# Patient Record
Sex: FEMALE | URBAN - METROPOLITAN AREA
[De-identification: names, ages, dates, MRNs, and addresses within clinical notes are randomized per-mention and may not be internally consistent; named-entity substitution may affect disease eponyms.]

---

## 2019-06-11 ENCOUNTER — APPOINTMENT (RX ONLY)
Dept: URBAN - METROPOLITAN AREA CLINIC 4 | Facility: CLINIC | Age: 74
Setting detail: DERMATOLOGY
End: 2019-06-11

## 2019-06-11 DIAGNOSIS — L82.1 OTHER SEBORRHEIC KERATOSIS: ICD-10-CM

## 2019-06-11 DIAGNOSIS — Z71.89 OTHER SPECIFIED COUNSELING: ICD-10-CM

## 2019-06-11 DIAGNOSIS — L57.8 OTHER SKIN CHANGES DUE TO CHRONIC EXPOSURE TO NONIONIZING RADIATION: ICD-10-CM

## 2019-06-11 DIAGNOSIS — L57.0 ACTINIC KERATOSIS: ICD-10-CM

## 2019-06-11 DIAGNOSIS — Z85.828 PERSONAL HISTORY OF OTHER MALIGNANT NEOPLASM OF SKIN: ICD-10-CM

## 2019-06-11 PROBLEM — I10 ESSENTIAL (PRIMARY) HYPERTENSION: Status: ACTIVE | Noted: 2019-06-11

## 2019-06-11 PROBLEM — D48.5 NEOPLASM OF UNCERTAIN BEHAVIOR OF SKIN: Status: ACTIVE | Noted: 2019-06-11

## 2019-06-11 PROBLEM — E13.9 OTHER SPECIFIED DIABETES MELLITUS WITHOUT COMPLICATIONS: Status: ACTIVE | Noted: 2019-06-11

## 2019-06-11 PROBLEM — J44.9 CHRONIC OBSTRUCTIVE PULMONARY DISEASE, UNSPECIFIED: Status: ACTIVE | Noted: 2019-06-11

## 2019-06-11 PROBLEM — E78.5 HYPERLIPIDEMIA, UNSPECIFIED: Status: ACTIVE | Noted: 2019-06-11

## 2019-06-11 PROCEDURE — 17000 DESTRUCT PREMALG LESION: CPT | Mod: 59

## 2019-06-11 PROCEDURE — 11102 TANGNTL BX SKIN SINGLE LES: CPT

## 2019-06-11 PROCEDURE — ? BIOPSY BY SHAVE METHOD

## 2019-06-11 PROCEDURE — ? COUNSELING

## 2019-06-11 PROCEDURE — ? LIQUID NITROGEN

## 2019-06-11 PROCEDURE — 99202 OFFICE O/P NEW SF 15 MIN: CPT | Mod: 25

## 2019-06-11 ASSESSMENT — LOCATION SIMPLE DESCRIPTION DERM
LOCATION SIMPLE: RIGHT FOREARM
LOCATION SIMPLE: CHEST
LOCATION SIMPLE: TRAPEZIAL NECK
LOCATION SIMPLE: CHIN
LOCATION SIMPLE: LEFT CHEEK
LOCATION SIMPLE: LEFT FOREARM

## 2019-06-11 ASSESSMENT — LOCATION ZONE DERM
LOCATION ZONE: TRUNK
LOCATION ZONE: FACE
LOCATION ZONE: ARM
LOCATION ZONE: NECK

## 2019-06-11 ASSESSMENT — LOCATION DETAILED DESCRIPTION DERM
LOCATION DETAILED: LEFT INFERIOR CENTRAL MALAR CHEEK
LOCATION DETAILED: LEFT PROXIMAL DORSAL FOREARM
LOCATION DETAILED: LEFT CHIN
LOCATION DETAILED: LEFT CENTRAL BUCCAL CHEEK
LOCATION DETAILED: UPPER STERNUM
LOCATION DETAILED: RIGHT PROXIMAL DORSAL FOREARM
LOCATION DETAILED: MID TRAPEZIAL NECK

## 2019-06-11 NOTE — PROCEDURE: LIQUID NITROGEN
Consent: The patient's consent was obtained including but not limited to risks of crusting, scabbing, blistering, scarring, darker or lighter pigmentary change, recurrence, incomplete removal and infection.
Render Note In Bullet Format When Appropriate: No
Duration Of Freeze Thaw-Cycle (Seconds): 0
Post-Care Instructions: I reviewed with the patient in detail post-care instructions. Patient is to wear sunprotection, and avoid picking at any of the treated lesions. Pt may apply Vaseline  or Aquaphor to crusted or scabbing areas.
Detail Level: Detailed

## 2019-06-11 NOTE — PROCEDURE: BIOPSY BY SHAVE METHOD
Curettage Text: The wound bed was treated with curettage after the biopsy was performed.
Lab: [id number]
Destruction After The Procedure: No
Consent: Written consent was obtained and risks were reviewed including but not limited to scarring, infection, bleeding, scabbing, incomplete removal, nerve damage and allergy to anesthesia.
Depth Of Biopsy: dermis
X Size Of Lesion In Cm: 0
Anesthesia Type: 1% lidocaine with epinephrine
Biopsy Type: H and E
Cryotherapy Text: The wound bed was treated with cryotherapy after the biopsy was performed.
Wound Care: Aquaphor
Lab Facility: 
Electrodesiccation Text: The wound bed was treated with electrodesiccation after the biopsy was performed.
Billing Type: Third-Party Bill
Was A Bandage Applied: Yes
Type Of Destruction Used: Curettage
Electrodesiccation And Curettage Text: The wound bed was treated with electrodesiccation and curettage after the biopsy was performed.
Biopsy Method: 15 blade
Dressing: Band-Aid
Detail Level: Detailed
Post-Care Instructions: I reviewed with the patient in detail post-care instructions. Patient is to keep the biopsy site dry overnight, and then apply bacitracin twice daily until healed. Patient may apply hydrogen peroxide soaks to remove any crusting.
Hemostasis: Aluminum Chloride and Electrocautery
Silver Nitrate Text: The wound bed was treated with silver nitrate after the biopsy was performed.
Notification Instructions: Patient will be notified of biopsy results. However, patient instructed to call the office if not contacted within 2 weeks.
Size Of Lesion In Cm: 1.2

## 2019-07-08 ENCOUNTER — APPOINTMENT (RX ONLY)
Dept: URBAN - METROPOLITAN AREA CLINIC 4 | Facility: CLINIC | Age: 74
Setting detail: DERMATOLOGY
End: 2019-07-08

## 2019-07-08 PROBLEM — D04.9 CARCINOMA IN SITU OF SKIN, UNSPECIFIED: Status: ACTIVE | Noted: 2019-07-08

## 2019-07-08 PROCEDURE — ? MOHS SURGERY PHONE CONSULTATION

## 2019-07-08 NOTE — PROCEDURE: MOHS SURGERY PHONE CONSULTATION
Does The Patient Have A Living Will (Optional)?: No
Pathology Accession #: [id number]
Office Location Of Mohs Surgery: [place] [id number]
Has The Pathology Report Been Received?: Yes
Detail Level: Simple
Referring Provider: Camryn Mason PA-C
Which Antibiotic Do They Take For Surgical Prophylaxis?: Amoxicillin (2 grams)
If Yes- Details?: heart attack 2018
Patient Reported Location: Left Lateral Mandibular Cheek
If Yes- Additional Details: ankle 12/2019; screws

## 2019-07-29 ENCOUNTER — APPOINTMENT (RX ONLY)
Dept: URBAN - METROPOLITAN AREA CLINIC 36 | Facility: CLINIC | Age: 74
Setting detail: DERMATOLOGY
End: 2019-07-29

## 2019-07-29 PROBLEM — C44.329 SQUAMOUS CELL CARCINOMA OF SKIN OF OTHER PARTS OF FACE: Status: ACTIVE | Noted: 2019-07-29

## 2019-07-29 PROCEDURE — 17312 MOHS ADDL STAGE: CPT

## 2019-07-29 PROCEDURE — 12052 INTMD RPR FACE/MM 2.6-5.0 CM: CPT

## 2019-07-29 PROCEDURE — 17311 MOHS 1 STAGE H/N/HF/G: CPT

## 2019-07-29 PROCEDURE — ? MOHS SURGERY

## 2019-07-29 NOTE — PROCEDURE: MOHS SURGERY
Hemostasis: Electrocautery
Stage 7: Additional Anesthesia Volume In Cc: 0
Wound Care (No Sutures): Petrolatum
Deep Sutures: 4-0 Maxon
Graft Cartilage Fenestration Text: The cartilage was fenestrated with a 2mm punch biopsy to help facilitate graft survival and healing.
Bilobed Flap Text: The defect edges were debeveled with a #15 scalpel blade.  Given the location of the defect and the proximity to free margins a bilobe flap was deemed most appropriate.  Using a sterile surgical marker, an appropriate bilobe flap drawn around the defect.    The area thus outlined was incised deep to adipose tissue with a #15 scalpel blade.  The skin margins were undermined to an appropriate distance in all directions utilizing iris scissors.
Information: Selecting Yes will display possible errors in your note based on the variables you have selected. This validation is only offered as a suggestion for you. PLEASE NOTE THAT THE VALIDATION TEXT WILL BE REMOVED WHEN YOU FINALIZE YOUR NOTE. IF YOU WANT TO FAX A PRELIMINARY NOTE YOU WILL NEED TO TOGGLE THIS TO 'NO' IF YOU DO NOT WANT IT IN YOUR FAXED NOTE.
X Size Of Lesion In Cm (Optional): 0.7
Cartilage Fenestration Performed?: No
Postop Diagnosis: same
Surgical Defect Width In Cm (Optional): 1.1
Show Biopsy Photo Reviewed Variable: Yes
Pain Refusal Text: I offered to prescribe pain medication but the patient refused to take this medication.
Surgeon/Pathologist Verbiage (Will Incorporate Name Of Surgeon From Intro If Not Blank): operated in two distinct and integrated capacities as the surgeon and pathologist.
Mercedes Flap Text: The defect edges were debeveled with a #15 scalpel blade.  Given the location of the defect, shape of the defect and the proximity to free margins a Mercedes flap was deemed most appropriate.  Using a sterile surgical marker, an appropriate advancement flap was drawn incorporating the defect and placing the expected incisions within the relaxed skin tension lines where possible. The area thus outlined was incised deep to adipose tissue with a #15 scalpel blade.  The skin margins were undermined to an appropriate distance in all directions utilizing iris scissors.
Melolabial Interpolation Flap Text: A decision was made to reconstruct the defect utilizing an interpolation axial flap and a staged reconstruction.  A telfa template was made of the defect.  This telfa template was then used to outline the melolabial interpolation flap.  The donor area for the pedicle flap was then injected with anesthesia.  The flap was excised through the skin and subcutaneous tissue down to the layer of the underlying musculature.  The pedicle flap was carefully excised within this deep plane to maintain its blood supply.  The edges of the donor site were undermined.   The donor site was closed in a primary fashion.  The pedicle was then rotated into position and sutured.  Once the tube was sutured into place, adequate blood supply was confirmed with blanching and refill.  The pedicle was then wrapped with xeroform gauze and dressed appropriately with a telfa and gauze bandage to ensure continued blood supply and protect the attached pedicle.
Complex Repair And Graft Additional Text (Will Appearing After The Standard Complex Repair Text): The complex repair was not sufficient to completely close the primary defect. The remaining additional defect was repaired with the graft mentioned below.
Suturegard Retention Suture: 2-0 Nylon
Mid-Level Procedure Text (D): After obtaining clear surgical margins the patient was sent to a mid-level provider for surgical repair.  The patient understands they will receive post-surgical care and follow-up from the mid-level provider.
Consent 2/Introductory Paragraph: Mohs surgery was explained to the patient and consent was obtained. The risks, benefits and alternatives to therapy were discussed in detail. Specifically, the risks of infection, scarring, bleeding, prolonged wound healing, incomplete removal, allergy to anesthesia, nerve injury and recurrence were addressed. Prior to the procedure, the treatment site was clearly identified and confirmed by the patient. All components of Universal Protocol/PAUSE Rule completed.
Suture Removal: 7 days
Referred To Otolaryngology For Closure Text (Leave Blank If You Do Not Want): After obtaining clear surgical margins the patient was sent to otolaryngology for surgical repair.  The patient understands they will receive post-surgical care and follow-up from the referring physician's office.
Island Pedicle Flap Text: The defect edges were debeveled with a #15 scalpel blade.  Given the location of the defect, shape of the defect and the proximity to free margins an island pedicle advancement flap was deemed most appropriate.  Using a sterile surgical marker, an appropriate advancement flap was drawn incorporating the defect, outlining the appropriate donor tissue and placing the expected incisions within the relaxed skin tension lines where possible.    The area thus outlined was incised deep to adipose tissue with a #15 scalpel blade.  The skin margins were undermined to an appropriate distance in all directions around the primary defect and laterally outward around the island pedicle utilizing iris scissors.  There was minimal undermining beneath the pedicle flap.
Plastic Surgeon Procedure Text (A): After obtaining clear surgical margins the patient was sent to plastics for surgical repair.  The patient understands they will receive post-surgical care and follow-up from the referring physician's office.
Bcc Histology Text: There were numerous aggregates of basaloid cells.
Area M Indication Text: Tumors in this location are included in Area M (cheek, forehead, scalp, neck, jawline and pretibial skin).  Mohs surgery is indicated for tumors in these anatomic locations.
Repair Type: Purse String Closure (Intermediate)
Cheiloplasty (Less Than 50%) Text: A decision was made to reconstruct the defect with a  cheiloplasty.  The defect was undermined extensively.  Additional obicularis oris muscle was excised with a 15 blade scalpel.  The defect was converted into a full thickness wedge, of less than 50% of the vertical height of the lip, to facilite a better cosmetic result.  Small vessels were then tied off with 5-0 monocyrl. The obicularis oris, superficial fascia, adipose and dermis were then reapproximated.  After the deeper layers were approximated the epidermis was reapproximated with particular care given to realign the vermilion border.
Subsequent Stages Histo Method Verbiage: Using a similar technique to that described above, a thin layer of tissue was removed from all areas where tumor was visible on the previous stage.  The tissue was again oriented, mapped, dyed, and processed as above.
Unique Flap 4 Name: Banner Flap
Rotation Flap Text: The defect edges were debeveled with a #15 scalpel blade.  Given the location of the defect, shape of the defect and the proximity to free margins a rotation flap was deemed most appropriate.  Using a sterile surgical marker, an appropriate rotation flap was drawn incorporating the defect and placing the expected incisions within the relaxed skin tension lines where possible.    The area thus outlined was incised deep to adipose tissue with a #15 scalpel blade.  The skin margins were undermined to an appropriate distance in all directions utilizing iris scissors.
Length To Time In Minutes Device Was In Place: 10
Consent (Spinal Accessory)/Introductory Paragraph: The rationale for Mohs was explained to the patient and consent was obtained. The risks, benefits and alternatives to therapy were discussed in detail. Specifically, the risks of damage to the spinal accessory nerve, infection, scarring, bleeding, prolonged wound healing, incomplete removal, allergy to anesthesia, and recurrence were addressed. Prior to the procedure, the treatment site was clearly identified and confirmed by the patient. All components of Universal Protocol/PAUSE Rule completed.
Island Pedicle Flap-Requiring Vessel Identification Text: The defect edges were debeveled with a #15 scalpel blade.  Given the location of the defect, shape of the defect and the proximity to free margins an island pedicle advancement flap was deemed most appropriate.  Using a sterile surgical marker, an appropriate advancement flap was drawn, based on the axial vessel mentioned above, incorporating the defect, outlining the appropriate donor tissue and placing the expected incisions within the relaxed skin tension lines where possible.    The area thus outlined was incised deep to adipose tissue with a #15 scalpel blade.  The skin margins were undermined to an appropriate distance in all directions around the primary defect and laterally outward around the island pedicle utilizing iris scissors.  There was minimal undermining beneath the pedicle flap.
Repair Performed By Another Provider Text (Leave Blank If You Do Not Want): After obtaining clear surgical margins the defect was repaired by another provider.
Keystone Flap Text: The defect edges were debeveled with a #15 scalpel blade.  Given the location of the defect, shape of the defect a keystone flap was deemed most appropriate.  Using a sterile surgical marker, an appropriate keystone flap was drawn incorporating the defect, outlining the appropriate donor tissue and placing the expected incisions within the relaxed skin tension lines where possible. The area thus outlined was incised deep to adipose tissue with a #15 scalpel blade.  The skin margins were undermined to an appropriate distance in all directions around the primary defect and laterally outward around the flap utilizing iris scissors.
Graft Donor Site Bandage (Optional-Leave Blank If You Don't Want In Note): Aquaphor and telefa placed on wound. Pressure dressing applied to donor site
Advancement Flap (Single) Text: The defect edges were debeveled with a #15 scalpel blade.  Given the location of the defect and the proximity to free margins a single advancement flap was deemed most appropriate.  Using a sterile surgical marker, an appropriate advancement flap was drawn incorporating the defect and placing the expected incisions within the relaxed skin tension lines where possible.    The area thus outlined was incised deep to adipose tissue with a #15 scalpel blade.  The skin margins were undermined to an appropriate distance in all directions utilizing iris scissors.
Epidermal Autograft Text: The defect edges were debeveled with a #15 scalpel blade.  Given the location of the defect, shape of the defect and the proximity to free margins an epidermal autograft was deemed most appropriate.  Using a sterile surgical marker, the primary defect shape was transferred to the donor site. The epidermal graft was then harvested.  The skin graft was then placed in the primary defect and oriented appropriately.
Asc Procedure Text (B): After obtaining clear surgical margins the patient was sent to an ASC for surgical repair.  The patient understands they will receive post-surgical care and follow-up from the ASC physician.
Lazy S Complex Repair Preamble Text (Leave Blank If You Do Not Want): Extensive wide undermining was performed at least 2 cm in all directions.
Melolabial Transposition Flap Text: The defect edges were debeveled with a #15 scalpel blade.  Given the location of the defect and the proximity to free margins a melolabial flap was deemed most appropriate.  Using a sterile surgical marker, an appropriate melolabial transposition flap was drawn incorporating the defect.    The area thus outlined was incised deep to adipose tissue with a #15 scalpel blade.  The skin margins were undermined to an appropriate distance in all directions utilizing iris scissors.
Split-Thickness Skin Graft Text: The defect edges were debeveled with a #15 scalpel blade.  Given the location of the defect, shape of the defect and the proximity to free margins a split thickness skin graft was deemed most appropriate.  Using a sterile surgical marker, the primary defect shape was transferred to the donor site. The split thickness graft was then harvested.  The skin graft was then placed in the primary defect and oriented appropriately.
Manual Repair Warning Statement: We plan on removing the manually selected variable below in favor of our much easier automatic structured text blocks found in the previous tab. We decided to do this to help make the flow better and give you the full power of structured data. Manual selection is never going to be ideal in our platform and I would encourage you to avoid using manual selection from this point on, especially since I will be sunsetting this feature. It is important that you do one of two things with the customized text below. First, you can save all of the text in a word file so you can have it for future reference. Second, transfer the text to the appropriate area in the Library tab. Lastly, if there is a flap or graft type which we do not have you need to let us know right away so I can add it in before the variable is hidden. No need to panic, we plan to give you roughly 6 months to make the change.
Consent Type: Consent 1 (Standard)
Consent (Scalp)/Introductory Paragraph: The rationale for Mohs was explained to the patient and consent was obtained. The risks, benefits and alternatives to therapy were discussed in detail. Specifically, the risks of changes in hair growth pattern secondary to repair, infection, scarring, bleeding, prolonged wound healing, incomplete removal, allergy to anesthesia, nerve injury and recurrence were addressed. Prior to the procedure, the treatment site was clearly identified and confirmed by the patient. All components of Universal Protocol/PAUSE Rule completed.
Stage 10: Additional Anesthesia Type: 1% lidocaine with epinephrine
Crescentic Advancement Flap Text: The defect edges were debeveled with a #15 scalpel blade.  Given the location of the defect and the proximity to free margins a crescentic advancement flap was deemed most appropriate.  Using a sterile surgical marker, the appropriate advancement flap was drawn incorporating the defect and placing the expected incisions within the relaxed skin tension lines where possible.    The area thus outlined was incised deep to adipose tissue with a #15 scalpel blade.  The skin margins were undermined to an appropriate distance in all directions utilizing iris scissors.
S Plasty Text: Given the location and shape of the defect, and the orientation of relaxed skin tension lines, an S-plasty was deemed most appropriate for repair.  Using a sterile surgical marker, the appropriate outline of the S-plasty was drawn, incorporating the defect and placing the expected incisions within the relaxed skin tension lines where possible.  The area thus outlined was incised deep to adipose tissue with a #15 scalpel blade.  The skin margins were undermined to an appropriate distance in all directions utilizing iris scissors. The skin flaps were advanced over the defect.  The opposing margins were then approximated with interrupted buried subcutaneous sutures.
Xenograft Text: The defect edges were debeveled with a #15 scalpel blade.  Given the location of the defect, shape of the defect and the proximity to free margins a xenograft was deemed most appropriate.  The graft was then trimmed to fit the size of the defect.  The graft was then placed in the primary defect and oriented appropriately.
Anesthesia Volume In Cc: 2.8
Helical Rim Advancement Flap Text: The defect edges were debeveled with a #15 blade scalpel.  Given the location of the defect and the proximity to free margins (helical rim) a double helical rim advancement flap was deemed most appropriate.  Using a sterile surgical marker, the appropriate advancement flaps were drawn incorporating the defect and placing the expected incisions between the helical rim and antihelix where possible.  The area thus outlined was incised through and through with a #15 scalpel blade.  With a skin hook and iris scissors, the flaps were gently and sharply undermined and freed up.
Same Histology In Subsequent Stages Text: The pattern and morphology of the tumor is as described in the first stage.
Lazy S Intermediate Repair Preamble Text (Leave Blank If You Do Not Want): Undermining was performed with blunt dissection.
O-Z Flap Text: The defect edges were debeveled with a #15 scalpel blade.  Given the location of the defect, shape of the defect and the proximity to free margins an O-Z flap was deemed most appropriate.  Using a sterile surgical marker, an appropriate transposition flap was drawn incorporating the defect and placing the expected incisions within the relaxed skin tension lines where possible. The area thus outlined was incised deep to adipose tissue with a #15 scalpel blade.  The skin margins were undermined to an appropriate distance in all directions utilizing iris scissors.
Z Plasty Text: The lesion was extirpated to the level of the fat with a #15 scalpel blade.  Given the location of the defect, shape of the defect and the proximity to free margins a Z-plasty was deemed most appropriate for repair.  Using a sterile surgical marker, the appropriate transposition arms of the Z-plasty were drawn incorporating the defect and placing the expected incisions within the relaxed skin tension lines where possible.    The area thus outlined was incised deep to adipose tissue with a #15 scalpel blade.  The skin margins were undermined to an appropriate distance in all directions utilizing iris scissors.  The opposing transposition arms were then transposed into place in opposite direction and anchored with interrupted buried subcutaneous sutures.
Partial Purse String (Intermediate) Text: Given the location of the defect and the characteristics of the surrounding skin an intermediate purse string closure was deemed most appropriate.  Undermining was performed circumfirentially around the surgical defect.  A purse string suture was then placed and tightened. Wound tension only allowed a partial closure of the circular defect.
Oculoplastic Surgeon Procedure Text (D): After obtaining clear surgical margins the patient was sent to oculoplastics for surgical repair.  The patient understands they will receive post-surgical care and follow-up from the referring physician's office.
Cheek Interpolation Flap Text: A decision was made to reconstruct the defect utilizing an interpolation axial flap and a staged reconstruction.  A telfa template was made of the defect.  This telfa template was then used to outline the Cheek Interpolation flap.  The donor area for the pedicle flap was then injected with anesthesia.  The flap was excised through the skin and subcutaneous tissue down to the layer of the underlying musculature.  The interpolation flap was carefully excised within this deep plane to maintain its blood supply.  The edges of the donor site were undermined.   The donor site was closed in a primary fashion.  The pedicle was then rotated into position and sutured.  Once the tube was sutured into place, adequate blood supply was confirmed with blanching and refill.  The pedicle was then wrapped with xeroform gauze and dressed appropriately with a telfa and gauze bandage to ensure continued blood supply and protect the attached pedicle.
Localized Dermabrasion With Wire Brush Text: The patient was draped in routine manner.  Localized dermabrasion using 3 x 17 mm wire brush was performed in routine manner to papillary dermis. This spot dermabrasion is being performed to complete skin cancer reconstruction. It also will eliminate the other sun damaged precancerous cells that are known to be part of the regional effect of a lifetime's worth of sun exposure. This localized dermabrasion is therapeutic and should not be considered cosmetic in any regard.
Medical Necessity Statement: Based on my medical judgement, Mohs surgery is the most appropriate treatment for this cancer compared to other treatments.
Stage 1: Number Of Blocks?: 1
Bilobed Transposition Flap Text: The defect edges were debeveled with a #15 scalpel blade.  Given the location of the defect and the proximity to free margins a bilobed transposition flap was deemed most appropriate.  Using a sterile surgical marker, an appropriate bilobe flap drawn around the defect.    The area thus outlined was incised deep to adipose tissue with a #15 scalpel blade.  The skin margins were undermined to an appropriate distance in all directions utilizing iris scissors.
Secondary Intention Text (Leave Blank If You Do Not Want): The defect will heal with secondary intention.
Number Of Stages: 2
Mauc Instructions: By selecting yes to the question below the MAUC number will be added into the note.  This will be calculated automatically based on the diagnosis chosen, the size entered, the body zone selected (H,M,L) and the specific indications you chose. You will also have the option to override the Mohs AUC if you disagree with the automatically calculated number and this option is found in the Case Summary tab.
Mastoid Interpolation Flap Text: A decision was made to reconstruct the defect utilizing an interpolation axial flap and a staged reconstruction.  A telfa template was made of the defect.  This telfa template was then used to outline the mastoid interpolation flap.  The donor area for the pedicle flap was then injected with anesthesia.  The flap was excised through the skin and subcutaneous tissue down to the layer of the underlying musculature.  The pedicle flap was carefully excised within this deep plane to maintain its blood supply.  The edges of the donor site were undermined.   The donor site was closed in a primary fashion.  The pedicle was then rotated into position and sutured.  Once the tube was sutured into place, adequate blood supply was confirmed with blanching and refill.  The pedicle was then wrapped with xeroform gauze and dressed appropriately with a telfa and gauze bandage to ensure continued blood supply and protect the attached pedicle.
Mohs Histo Method Verbiage: Each section was then chromacoded and processed in the Mohs lab using the Mohs protocol and submitted for frozen section.
Modified Advancement Flap Text: The defect edges were debeveled with a #15 scalpel blade.  Given the location of the defect, shape of the defect and the proximity to free margins a modified advancement flap was deemed most appropriate.  Using a sterile surgical marker, an appropriate advancement flap was drawn incorporating the defect and placing the expected incisions within the relaxed skin tension lines where possible.    The area thus outlined was incised deep to adipose tissue with a #15 scalpel blade.  The skin margins were undermined to an appropriate distance in all directions utilizing iris scissors.
Unique Flap 1 Name: Myocutaneous Island pedicle Flap
Consent 3/Introductory Paragraph: I gave the patient a chance to ask questions they had about the procedure.  Following this I explained the Mohs procedure and consent was obtained. The risks, benefits and alternatives to therapy were discussed in detail. Specifically, the risks of infection, scarring, bleeding, prolonged wound healing, incomplete removal, allergy to anesthesia, nerve injury and recurrence were addressed. Prior to the procedure, the treatment site was clearly identified and confirmed by the patient. All components of Universal Protocol/PAUSE Rule completed.
Island Pedicle Flap With Canthal Suspension Text: The defect edges were debeveled with a #15 scalpel blade.  Given the location of the defect, shape of the defect and the proximity to free margins an island pedicle advancement flap was deemed most appropriate.  Using a sterile surgical marker, an appropriate advancement flap was drawn incorporating the defect, outlining the appropriate donor tissue and placing the expected incisions within the relaxed skin tension lines where possible. The area thus outlined was incised deep to adipose tissue with a #15 scalpel blade.  The skin margins were undermined to an appropriate distance in all directions around the primary defect and laterally outward around the island pedicle utilizing iris scissors.  There was minimal undermining beneath the pedicle flap. A suspension suture was placed in the canthal tendon to prevent tension and prevent ectropion.
Graft Donor Site Epidermal Sutures (Optional): 5-0 Ethibond
Area L Indication Text: Tumors in this location are included in Area L (trunk and extremities).  Mohs surgery is indicated for larger tumors, or tumors with aggressive histologic features, in these anatomic locations.
Bcc Infiltrative Histology Text: There were numerous aggregates of basaloid cells demonstrating an infiltrative pattern.
Cheiloplasty (Complex) Text: A decision was made to reconstruct the defect with a  cheiloplasty.  The defect was undermined extensively.  Additional obicularis oris muscle was excised with a 15 blade scalpel.  The defect was converted into a full thickness wedge to facilite a better cosmetic result.  Small vessels were then tied off with 5-0 monocyrl. The obicularis oris, superficial fascia, adipose and dermis were then reapproximated.  After the deeper layers were approximated the epidermis was reapproximated with particular care given to realign the vermilion border.
Mohs Rapid Report Verbiage: The area of clinically evident tumor was marked with skin marking ink and appropriately hatched.  The initial incision was made following the Mohs approach through the skin.  The specimen was taken to the lab, divided into the necessary number of pieces, chromacoded and processed according to the Mohs protocol.  This was repeated in successive stages until a tumor free defect was achieved.
Spiral Flap Text: The defect edges were debeveled with a #15 scalpel blade.  Given the location of the defect, shape of the defect and the proximity to free margins a spiral flap was deemed most appropriate.  Using a sterile surgical marker, an appropriate rotation flap was drawn incorporating the defect and placing the expected incisions within the relaxed skin tension lines where possible. The area thus outlined was incised deep to adipose tissue with a #15 scalpel blade.  The skin margins were undermined to an appropriate distance in all directions utilizing iris scissors.
Unique Flap 1 Text: A decision was made to reconstruct the defect utilizing a myocutaneous Island pedicle Flap based on the levator labii superioris muscle.  A telfa template was made of the defect.  This telfa template was then used to outline the myocutaneous flap, based along the meilolabial fold.  The donor area for the pedicle flap was then injected with anesthesia.  The flap was excised through the skin and subcutaneous tissue down to the layer of the underlying musculature.  The myocutaneous flap was carefully excised within this deep plane to maintain its blood supply. Based on the muscle. The edges of the donor site were undermined.   The donor site was closed in a primary fashion to the point of transposition.  The pedicle was then transposed into position and sutured.  Once the flap was sutured into place, adequate blood supply was confirmed with blanching and refill.
Previous Accession (Optional): [id number]
Consent (Near Eyelid Margin)/Introductory Paragraph: The rationale for Mohs was explained to the patient and consent was obtained. The risks, benefits and alternatives to therapy were discussed in detail. Specifically, the risks of ectropion or eyelid deformity, infection, scarring, bleeding, prolonged wound healing, incomplete removal, allergy to anesthesia, nerve injury and recurrence were addressed. Prior to the procedure, the treatment site was clearly identified and confirmed by the patient. All components of Universal Protocol/PAUSE Rule completed.
Consent (Ear)/Introductory Paragraph: The rationale for Mohs was explained to the patient and consent was obtained. The risks, benefits and alternatives to therapy were discussed in detail. Specifically, the risks of ear deformity, infection, scarring, bleeding, prolonged wound healing, incomplete removal, allergy to anesthesia, nerve injury and recurrence were addressed. Prior to the procedure, the treatment site was clearly identified and confirmed by the patient. All components of Universal Protocol/PAUSE Rule completed.
Advancement Flap (Double) Text: The defect edges were debeveled with a #15 scalpel blade.  Given the location of the defect and the proximity to free margins a double advancement flap was deemed most appropriate.  Using a sterile surgical marker, the appropriate advancement flaps were drawn incorporating the defect and placing the expected incisions within the relaxed skin tension lines where possible.    The area thus outlined was incised deep to adipose tissue with a #15 scalpel blade.  The skin margins were undermined to an appropriate distance in all directions utilizing iris scissors.
Unna Boot Text: An Unna boot was placed to help immobilize the limb and facilitate more rapid healing.
O-T Plasty Text: The defect edges were debeveled with a #15 scalpel blade.  Given the location of the defect, shape of the defect and the proximity to free margins an O-T plasty was deemed most appropriate.  Using a sterile surgical marker, an appropriate O-T plasty was drawn incorporating the defect and placing the expected incisions within the relaxed skin tension lines where possible.    The area thus outlined was incised deep to adipose tissue with a #15 scalpel blade.  The skin margins were undermined to an appropriate distance in all directions utilizing iris scissors.
Dermal Autograft Text: The defect edges were debeveled with a #15 scalpel blade.  Given the location of the defect, shape of the defect and the proximity to free margins a dermal autograft was deemed most appropriate.  Using a sterile surgical marker, the primary defect shape was transferred to the donor site. The area thus outlined was incised deep to adipose tissue with a #15 scalpel blade.  The harvested graft was then trimmed of adipose and epidermal tissue until only dermis was left.  The skin graft was then placed in the primary defect and oriented appropriately.
M-Plasty Complex Repair Preamble Text (Leave Blank If You Do Not Want): Extensive wide undermining was performed.
Rhombic Flap Text: The defect edges were debeveled with a #15 scalpel blade.  Given the location of the defect and the proximity to free margins a rhombic flap was deemed most appropriate.  Using a sterile surgical marker, an appropriate rhombic flap was drawn incorporating the defect.    The area thus outlined was incised deep to adipose tissue with a #15 scalpel blade.  The skin margins were undermined to an appropriate distance in all directions utilizing iris scissors.
Anesthesia Volume In Cc: 6
Cartilage Graft Text: The defect edges were debeveled with a #15 scalpel blade.  Given the location of the defect, shape of the defect, the fact the defect involved a full thickness cartilage defect a cartilage graft was deemed most appropriate.  An appropriate donor site was identified, cleansed, and anesthetized. The cartilage graft was then harvested and transferred to the recipient site, oriented appropriately and then sutured into place.  The secondary defect was then repaired using a primary closure.
Detail Level: Detailed
Post-Care Instructions: I reviewed with the patient in detail post-care instructions. Patient is not to engage in any heavy lifting, exercise, or swimming for the next 14 days. Should the patient develop any fevers, chills, bleeding, severe pain patient will contact the office immediately.
Purse String (Simple) Text: Given the location of the defect and the characteristics of the surrounding skin a purse string closure was deemed most appropriate.  Undermining was performed circumfirentially around the surgical defect.  A purse string suture was then placed and tightened.
A-T Advancement Flap Text: The defect edges were debeveled with a #15 scalpel blade.  Given the location of the defect, shape of the defect and the proximity to free margins an A-T advancement flap was deemed most appropriate.  Using a sterile surgical marker, an appropriate advancement flap was drawn incorporating the defect and placing the expected incisions within the relaxed skin tension lines where possible.    The area thus outlined was incised deep to adipose tissue with a #15 scalpel blade.  The skin margins were undermined to an appropriate distance in all directions utilizing iris scissors.
V-Y Plasty Text: The defect edges were debeveled with a #15 scalpel blade.  Given the location of the defect, shape of the defect and the proximity to free margins an V-Y advancement flap was deemed most appropriate.  Using a sterile surgical marker, an appropriate advancement flap was drawn incorporating the defect and placing the expected incisions within the relaxed skin tension lines where possible.    The area thus outlined was incised deep to adipose tissue with a #15 scalpel blade.  The skin margins were undermined to an appropriate distance in all directions utilizing iris scissors.
Dressing: dry sterile dressing
Bilateral Helical Rim Advancement Flap Text: The defect edges were debeveled with a #15 blade scalpel.  Given the location of the defect and the proximity to free margins (helical rim) a bilateral helical rim advancement flap was deemed most appropriate.  Using a sterile surgical marker, the appropriate advancement flaps were drawn incorporating the defect and placing the expected incisions between the helical rim and antihelix where possible.  The area thus outlined was incised through and through with a #15 scalpel blade.  With a skin hook and iris scissors, the flaps were gently and sharply undermined and freed up.
No Residual Tumor Seen Histology Text: There were no malignant cells seen in the sections examined.
Stage 2: Additional Anesthesia Type: 1% lidocaine with 1:100,000 epinephrine and 408mcg clindamycin/ml and a 1:10 solution of 8.4% sodium bicarbonate
Suturegard Intro: Intraoperative tissue expansion was performed, utilizing the SUTUREGARD device, in order to reduce wound tension.
Double O-Z Flap Text: The defect edges were debeveled with a #15 scalpel blade.  Given the location of the defect, shape of the defect and the proximity to free margins a Double O-Z flap was deemed most appropriate.  Using a sterile surgical marker, an appropriate transposition flap was drawn incorporating the defect and placing the expected incisions within the relaxed skin tension lines where possible. The area thus outlined was incised deep to adipose tissue with a #15 scalpel blade.  The skin margins were undermined to an appropriate distance in all directions utilizing iris scissors.
V-Y Flap Text: The defect edges were debeveled with a #15 scalpel blade.  Given the location of the defect, shape of the defect and the proximity to free margins a V-Y flap was deemed most appropriate.  Using a sterile surgical marker, an appropriate advancement flap was drawn incorporating the defect and placing the expected incisions within the relaxed skin tension lines where possible.    The area thus outlined was incised deep to adipose tissue with a #15 scalpel blade.  The skin margins were undermined to an appropriate distance in all directions utilizing iris scissors.
Suturegard Body: The suture ends were repeatedly re-tightened and re-clamped to achieve the desired tissue expansion.
Cheek-To-Nose Interpolation Flap Text: A decision was made to reconstruct the defect utilizing an interpolation axial flap and a staged reconstruction.  A telfa template was made of the defect.  This telfa template was then used to outline the Cheek-To-Nose Interpolation flap.  The donor area for the pedicle flap was then injected with anesthesia.  The flap was excised through the skin and subcutaneous tissue down to the layer of the underlying musculature.  The interpolation flap was carefully excised within this deep plane to maintain its blood supply.  The edges of the donor site were undermined.   The donor site was closed in a primary fashion.  The pedicle was then rotated into position and sutured.  Once the tube was sutured into place, adequate blood supply was confirmed with blanching and refill.  The pedicle was then wrapped with xeroform gauze and dressed appropriately with a telfa and gauze bandage to ensure continued blood supply and protect the attached pedicle.
Eye Protection Verbiage: Before proceeding with the stage, a plastic scleral shield was inserted. The globe was anesthetized with a few drops of 1% lidocaine with 1:100,000 epinephrine. Then, an appropriate sized scleral shield was chosen and coated with lacrilube ointment. The shield was gently inserted and left in place for the duration of each stage. After the stage was completed, the shield was gently removed.
Tarsorrhaphy Text: A tarsorrhaphy was performed using Frost sutures.
Alternatives Discussed Intro (Do Not Add Period): I discussed alternative treatments to Mohs surgery and specifically discussed the risks and benefits of
Estimated Blood Loss (Cc): less than 5 cc
Trilobed Flap Text: The defect edges were debeveled with a #15 scalpel blade.  Given the location of the defect and the proximity to free margins a trilobed flap was deemed most appropriate.  Using a sterile surgical marker, an appropriate trilobed flap drawn around the defect.    The area thus outlined was incised deep to adipose tissue with a #15 scalpel blade.  The skin margins were undermined to an appropriate distance in all directions utilizing iris scissors.
No Repair - Repaired With Adjacent Surgical Defect Text (Leave Blank If You Do Not Want): After obtaining clear surgical margins the defect was repaired concurrently with another surgical defect which was in close approximation.
Primary Defect Length In Cm (Final Defect Size - Required For Flaps/Grafts): 1.2
Posterior Auricular Interpolation Flap Text: A decision was made to reconstruct the defect utilizing an interpolation axial flap and a staged reconstruction.  A telfa template was made of the defect.  This telfa template was then used to outline the posterior auricular interpolation flap.  The donor area for the pedicle flap was then injected with anesthesia.  The flap was excised through the skin and subcutaneous tissue down to the layer of the underlying musculature.  The pedicle flap was carefully excised within this deep plane to maintain its blood supply.  The edges of the donor site were undermined.   The donor site was closed in a primary fashion.  The pedicle was then rotated into position and sutured.  Once the tube was sutured into place, adequate blood supply was confirmed with blanching and refill.  The pedicle was then wrapped with xeroform gauze and dressed appropriately with a telfa and gauze bandage to ensure continued blood supply and protect the attached pedicle.
Graft Basting Suture (Optional): 5-0 Fast Absorbing Gut
Mucosal Advancement Flap Text: Given the location of the defect, shape of the defect and the proximity to free margins a mucosal advancement flap was deemed most appropriate. Incisions were made with a 15 blade scalpel in the appropriate fashion along the cutaneous vermilion border and the mucosal lip. The remaining actinically damaged mucosal tissue was excised.  The mucosal advancement flap was then elevated to the gingival sulcus with care taken to preserve the neurovascular structures and advanced into the primary defect. Care was taken to ensure that precise realignment of the vermilion border was achieved.
Unique Flap 2 Name: Peng Flap
Retention Suture Bite Size: 3 mm
Consent (Temporal Branch)/Introductory Paragraph: The rationale for Mohs was explained to the patient and consent was obtained. The risks, benefits and alternatives to therapy were discussed in detail. Specifically, the risks of damage to the temporal branch of the facial nerve, infection, scarring, bleeding, prolonged wound healing, incomplete removal, allergy to anesthesia, and recurrence were addressed. Prior to the procedure, the treatment site was clearly identified and confirmed by the patient. All components of Universal Protocol/PAUSE Rule completed.
Closure 4 Information: This tab is for additional flaps and grafts above and beyond our usual structured repairs.  Please note if you enter information here it will not currently bill and you will need to add the billing information manually.
Alar Island Pedicle Flap Text: The defect edges were debeveled with a #15 scalpel blade.  Given the location of the defect, shape of the defect and the proximity to the alar rim an island pedicle advancement flap was deemed most appropriate.  Using a sterile surgical marker, an appropriate advancement flap was drawn incorporating the defect, outlining the appropriate donor tissue and placing the expected incisions within the nasal ala running parallel to the alar rim. The area thus outlined was incised with a #15 scalpel blade.  The skin margins were undermined minimally to an appropriate distance in all directions around the primary defect and laterally outward around the island pedicle utilizing iris scissors.  There was minimal undermining beneath the pedicle flap.
Repair Anesthesia Method: local infiltration
Star Wedge Flap Text: The defect edges were debeveled with a #15 scalpel blade.  Given the location of the defect, shape of the defect and the proximity to free margins a star wedge flap was deemed most appropriate.  Using a sterile surgical marker, an appropriate rotation flap was drawn incorporating the defect and placing the expected incisions within the relaxed skin tension lines where possible. The area thus outlined was incised deep to adipose tissue with a #15 scalpel blade.  The skin margins were undermined to an appropriate distance in all directions utilizing iris scissors.
Ear Wedge Repair Text: A wedge excision was completed by carrying down an excision through the full thickness of the ear and cartilage with an inward facing Burow's triangle. The wound was then closed in a layered fashion.
Unique Flap 2 Text: A decision was made to reconstruct the defect utilizing a Peng Flap (Bilateral Advancement Rotation Flap). Given the location of the defect and the proximity to free margins, this flap was deemed most appropriate.  Using a sterile surgical marker, the appropriate rotation flaps were drawn incorporating the defect and placing the expected incisions within the relaxed skin tension lines where possible.    The area thus outlined was incised deep to adipose tissue with a #15 scalpel blade.  The skin margins were undermined to an appropriate distance in all directions utilizing iris scissors.
Simple / Intermediate / Complex Repair - Final Wound Length In Cm: 2.9
Full Thickness Lip Wedge Repair (Flap) Text: Given the location of the defect and the proximity to free margins a full thickness wedge repair was deemed most appropriate.  Using a sterile surgical marker, the appropriate repair was drawn incorporating the defect and placing the expected incisions perpendicular to the vermilion border.  The vermilion border was also meticulously outlined to ensure appropriate reapproximation during the repair.  The area thus outlined was incised through and through with a #15 scalpel blade.  The muscularis and dermis were reaproximated with deep sutures following hemostasis. Care was taken to realign the vermilion border before proceeding with the superficial closure.  Once the vermilion was realigned the superfical and mucosal closure was finished.
Unique Flap 3 Text: The defect edges were debeveled with a #15 scalpel blade.  Given the location of the defect, shape of the defect and the proximity to free margins a Mercedes (double advancement flap) was deemed most appropriate.  Using a sterile surgical marker, the appropriate transposition flaps were drawn incorporating the defect and placing the expected incisions within the relaxed skin tension lines where possible.    The area thus outlined was incised deep to adipose tissue with a #15 scalpel blade.  The skin margins were undermined to an appropriate distance in all directions utilizing iris scissors.  Hemostasis was achieved with electrocautery.  The flaps were then advanced into the defect and anchored with interrupted buried subcutaneous sutures.
Transposition Flap Text: The defect edges were debeveled with a #15 scalpel blade.  Given the location of the defect and the proximity to free margins a transposition flap was deemed most appropriate.  Using a sterile surgical marker, an appropriate transposition flap was drawn incorporating the defect.    The area thus outlined was incised deep to adipose tissue with a #15 scalpel blade.  The skin margins were undermined to an appropriate distance in all directions utilizing iris scissors.
Consent (Nose)/Introductory Paragraph: The rationale for Mohs was explained to the patient and consent was obtained. The risks, benefits and alternatives to therapy were discussed in detail. Specifically, the risks of nasal deformity, changes in the flow of air through the nose, infection, scarring, bleeding, prolonged wound healing, incomplete removal, allergy to anesthesia, nerve injury and recurrence were addressed. Prior to the procedure, the treatment site was clearly identified and confirmed by the patient. All components of Universal Protocol/PAUSE Rule completed.
O-Z Plasty Text: The defect edges were debeveled with a #15 scalpel blade.  Given the location of the defect, shape of the defect and the proximity to free margins an O-Z plasty (double transposition flap) was deemed most appropriate.  Using a sterile surgical marker, the appropriate transposition flaps were drawn incorporating the defect and placing the expected incisions within the relaxed skin tension lines where possible.    The area thus outlined was incised deep to adipose tissue with a #15 scalpel blade.  The skin margins were undermined to an appropriate distance in all directions utilizing iris scissors.  Hemostasis was achieved with electrocautery.  The flaps were then transposed into place, one clockwise and the other counterclockwise, and anchored with interrupted buried subcutaneous sutures.
Home Suture Removal Text: Patient was provided instructions on removing sutures and will remove their sutures at home.  If they have any questions or difficulties they will call the office.
Burow's Advancement Flap Text: The defect edges were debeveled with a #15 scalpel blade.  Given the location of the defect and the proximity to free margins a Burow's advancement flap was deemed most appropriate.  Using a sterile surgical marker, the appropriate advancement flap was drawn incorporating the defect and placing the expected incisions within the relaxed skin tension lines where possible.    The area thus outlined was incised deep to adipose tissue with a #15 scalpel blade.  The skin margins were undermined to an appropriate distance in all directions utilizing iris scissors.
Skin Substitute Text: The defect edges were debeveled with a #15 scalpel blade.  Given the location of the defect, shape of the defect and the proximity to free margins a skin substitute graft was deemed most appropriate.  The graft material was trimmed to fit the size of the defect. The graft was then placed in the primary defect and oriented appropriately.
Anesthesia Type: 0.5% lidocaine with 1:200,000 epinephrine and a 1:10 solution of 8.4% sodium bicarbonate and 408mcg clindamycin/ml
Composite Graft Text: The defect edges were debeveled with a #15 scalpel blade.  Given the location of the defect, shape of the defect, the proximity to free margins and the fact the defect was full thickness a composite graft was deemed most appropriate.  The defect was outline and then transferred to the donor site.  A full thickness graft was then excised from the donor site. The graft was then placed in the primary defect, oriented appropriately and then sutured into place.  The secondary defect was then repaired using a primary closure.
Rhomboid Transposition Flap Text: The defect edges were debeveled with a #15 scalpel blade.  Given the location of the defect and the proximity to free margins a rhomboid transposition flap was deemed most appropriate.  Using a sterile surgical marker, an appropriate rhomboid flap was drawn incorporating the defect.    The area thus outlined was incised deep to adipose tissue with a #15 scalpel blade.  The skin margins were undermined to an appropriate distance in all directions utilizing iris scissors.
O-T Advancement Flap Text: The defect edges were debeveled with a #15 scalpel blade.  Given the location of the defect, shape of the defect and the proximity to free margins an O-T advancement flap was deemed most appropriate.  Using a sterile surgical marker, an appropriate advancement flap was drawn incorporating the defect and placing the expected incisions within the relaxed skin tension lines where possible.    The area thus outlined was incised deep to adipose tissue with a #15 scalpel blade.  The skin margins were undermined to an appropriate distance in all directions utilizing iris scissors.
Purse String (Intermediate) Text: Given the location of the defect and the characteristics of the surrounding skin a purse string intermediate closure was deemed most appropriate.  Undermining was performed circumfirentially around the surgical defect.  A purse string suture was then placed and tightened.
H Plasty Text: Given the location of the defect, shape of the defect and the proximity to free margins a H-plasty was deemed most appropriate for repair.  Using a sterile surgical marker, the appropriate advancement arms of the H-plasty were drawn incorporating the defect and placing the expected incisions within the relaxed skin tension lines where possible. The area thus outlined was incised deep to adipose tissue with a #15 scalpel blade. The skin margins were undermined to an appropriate distance in all directions utilizing iris scissors.  The opposing advancement arms were then advanced into place in opposite direction and anchored with interrupted buried subcutaneous sutures.
Ear Star Wedge Flap Text: The defect edges were debeveled with a #15 blade scalpel.  Given the location of the defect and the proximity to free margins (helical rim) an ear star wedge flap was deemed most appropriate.  Using a sterile surgical marker, the appropriate flap was drawn incorporating the defect and placing the expected incisions between the helical rim and antihelix where possible.  The area thus outlined was incised through and through with a #15 scalpel blade.
Inflammation Suggestive Of Cancer Camouflage Histology Text: There was a dense lymphocytic infiltrate which prevented adequate histologic evaluation of adjacent structures.
Surgeon Performing Repair (Optional): Hafsa
Non-Graft Cartilage Fenestration Text: The cartilage was fenestrated with a 2mm punch biopsy to help facilitate healing.
Initial Size Of Lesion: 0.8
Mohs Method Verbiage: An incision at a 45 degree angle following the standard Mohs approach was done and the specimen was harvested as a microscopic controlled layer.
Interpolation Flap Text: A decision was made to reconstruct the defect utilizing an interpolation axial flap and a staged reconstruction.  A telfa template was made of the defect.  This telfa template was then used to outline the interpolation flap.  The donor area for the pedicle flap was then injected with anesthesia.  The flap was excised through the skin and subcutaneous tissue down to the layer of the underlying musculature.  The interpolation flap was carefully excised within this deep plane to maintain its blood supply.  The edges of the donor site were undermined.   The donor site was closed in a primary fashion.  The pedicle was then rotated into position and sutured.  Once the tube was sutured into place, adequate blood supply was confirmed with blanching and refill.  The pedicle was then wrapped with xeroform gauze and dressed appropriately with a telfa and gauze bandage to ensure continued blood supply and protect the attached pedicle.
Donor Site Anesthesia Type: same as repair anesthesia
Advancement-Rotation Flap Text: The defect edges were debeveled with a #15 scalpel blade.  Given the location of the defect, shape of the defect and the proximity to free margins an advancement-rotation flap was deemed most appropriate.  Using a sterile surgical marker, an appropriate flap was drawn incorporating the defect and placing the expected incisions within the relaxed skin tension lines where possible. The area thus outlined was incised deep to adipose tissue with a #15 scalpel blade.  The skin margins were undermined to an appropriate distance in all directions utilizing iris scissors.
Complex Repair And Flap Additional Text (Will Appearing After The Standard Complex Repair Text): The complex repair was not sufficient to completely close the primary defect. The remaining additional defect was repaired with the flap mentioned below.
Consent 1/Introductory Paragraph: The rationale for Mohs was explained to the patient and consent was obtained. The risks, benefits and alternatives to therapy were discussed in detail. Specifically, the risks of infection, scarring, bleeding, prolonged wound healing, incomplete removal, allergy to anesthesia, nerve injury and recurrence were addressed. Prior to the procedure, the treatment site was clearly identified and confirmed by the patient. All components of Universal Protocol/PAUSE Rule completed.
Dorsal Nasal Flap Text: The defect edges were debeveled with a #15 scalpel blade.  Given the location of the defect and the proximity to free margins a dorsal nasal flap,based upon the glabellar folds, was deemed most appropriate.  Using a sterile surgical marker, an appropriate dorsal nasal flap was drawn around the defect.    The area thus outlined was incised deep to adipose tissue with a #15 scalpel blade.  The skin margins were undermined to an appropriate distance in all directions utilizing iris scissors.
Area H Indication Text: Tumors in this location are included in Area H (eyelids, eyebrows, nose, lips, chin, ear, pre-auricular, post-auricular, temple, genitalia, hands, feet, ankles and areola).  Tissue conservation is critical in these anatomic locations.
Hatchet Flap Text: The defect edges were debeveled with a #15 scalpel blade.  Given the location of the defect, shape of the defect and the proximity to free margins a hatchet flap based from the glabella was deemed most appropriate.  Using a sterile surgical marker, an appropriate glabellar hatchet flap was drawn incorporating the defect and placing the expected incisions within the relaxed skin tension lines where possible.    The area thus outlined was incised deep to adipose tissue with a #15 scalpel blade.  The skin margins were undermined to an appropriate distance in all directions utilizing iris scissors.
Paramedian Forehead Flap Text: A decision was made to reconstruct the defect utilizing an interpolation axial flap and a staged reconstruction.  A telfa template was made of the defect.  This telfa template was then used to outline the paramedian forehead pedicle flap.  The donor area for the pedicle flap was then injected with anesthesia.  The flap was excised through the skin and subcutaneous tissue down to the layer of the underlying musculature.  The pedicle flap was carefully excised within this deep plane to maintain its blood supply.  The edges of the donor site were undermined.   The donor site was closed in a primary fashion.  The pedicle was then rotated into position and sutured.  Once the tube was sutured into place, adequate blood supply was confirmed with blanching and refill.  The pedicle was then wrapped with xeroform gauze and dressed appropriately with a telfa and gauze bandage to ensure continued blood supply and protect the attached pedicle.
Unique Flap 3 Name: Mercedes Flap
Mohs Case Number: 
Consent (Marginal Mandibular)/Introductory Paragraph: The rationale for Mohs was explained to the patient and consent was obtained. The risks, benefits and alternatives to therapy were discussed in detail. Specifically, the risks of damage to the marginal mandibular branch of the facial nerve, infection, scarring, bleeding, prolonged wound healing, incomplete removal, allergy to anesthesia, and recurrence were addressed. Prior to the procedure, the treatment site was clearly identified and confirmed by the patient. All components of Universal Protocol/PAUSE Rule completed.
Epidermal Closure Graft Donor Site (Optional): simple interrupted
Double Island Pedicle Flap Text: The defect edges were debeveled with a #15 scalpel blade.  Given the location of the defect, shape of the defect and the proximity to free margins a double island pedicle advancement flap was deemed most appropriate.  Using a sterile surgical marker, an appropriate advancement flap was drawn incorporating the defect, outlining the appropriate donor tissue and placing the expected incisions within the relaxed skin tension lines where possible.    The area thus outlined was incised deep to adipose tissue with a #15 scalpel blade.  The skin margins were undermined to an appropriate distance in all directions around the primary defect and laterally outward around the island pedicle utilizing iris scissors.  There was minimal undermining beneath the pedicle flap.
Oculoplastic Surgeon (A): Diogo
Ftsg Text: The defect edges were debeveled with a #15 scalpel blade.  Given the location of the defect, shape of the defect and the proximity to free margins a full thickness skin graft was deemed most appropriate.  Using a sterile surgical marker, the primary defect shape was transferred to the donor site. The area thus outlined was incised deep to adipose tissue with a #15 scalpel blade.  The harvested graft was then trimmed of adipose tissue until only dermis and epidermis was left.  The skin margins of the secondary defect were undermined to an appropriate distance in all directions utilizing iris scissors.  The secondary defect was closed with interrupted buried subcutaneous sutures.  The skin edges were then re-apposed with running  sutures.  The skin graft was then placed in the primary defect and oriented appropriately.
Unique Flap 4 Text: The defect edges were debeveled with a #15 scalpel blade.  Given the location of the defect and the proximity to free margins a Banner transposition flap was deemed most appropriate.  Using a sterile surgical marker, an appropriate Banner transposition flap was drawn incorporating the defect.    The area thus outlined was incised deep to adipose tissue with a #15 scalpel blade.  The skin margins were undermined to an appropriate distance in all directions utilizing iris scissors.
Muscle Hinge Flap Text: The defect edges were debeveled with a #15 scalpel blade.  Given the size, depth and location of the defect and the proximity to free margins a muscle hinge flap was deemed most appropriate.  Using a sterile surgical marker, an appropriate hinge flap was drawn incorporating the defect. The area thus outlined was incised with a #15 scalpel blade.  The skin margins were undermined to an appropriate distance in all directions utilizing iris scissors.
Location Indication Override (Is Already Calculated Based On Selected Body Location): Area M
Referring Physician (Optional): ALESSANDRA Mason
Consent (Lip)/Introductory Paragraph: The rationale for Mohs was explained to the patient and consent was obtained. The risks, benefits and alternatives to therapy were discussed in detail. Specifically, the risks of lip deformity, changes in the oral aperture, infection, scarring, bleeding, prolonged wound healing, incomplete removal, allergy to anesthesia, nerve injury and recurrence were addressed. Prior to the procedure, the treatment site was clearly identified and confirmed by the patient. All components of Universal Protocol/PAUSE Rule completed.
Epidermal Closure: running subcuticular
Closure 2 Information: This tab is for additional flaps and grafts, including complex repair and grafts and complex repair and flaps. You can also specify a different location for the additional defect, if the location is the same you do not need to select a new one. We will insert the automated text for the repair you select below just as we do for solitary flaps and grafts. Please note that at this time if you select a location with a different insurance zone you will need to override the ICD10 and CPT if appropriate.
Chonodrocutaneous Helical Advancement Flap Text: The defect edges were debeveled with a #15 scalpel blade.  Given the location of the defect and the proximity to free margins a chondrocutaneous helical advancement flap was deemed most appropriate.  Using a sterile surgical marker, the appropriate advancement flap was drawn incorporating the defect and placing the expected incisions within the relaxed skin tension lines where possible.    The area thus outlined was incised deep to adipose tissue with a #15 scalpel blade.  The skin margins were undermined to an appropriate distance in all directions utilizing iris scissors.
Tissue Cultured Epidermal Autograft Text: The defect edges were debeveled with a #15 scalpel blade.  Given the location of the defect, shape of the defect and the proximity to free margins a tissue cultured epidermal autograft was deemed most appropriate.  The graft was then trimmed to fit the size of the defect.  The graft was then placed in the primary defect and oriented appropriately.
Double O-Z Plasty Text: The defect edges were debeveled with a #15 scalpel blade.  Given the location of the defect, shape of the defect and the proximity to free margins a Double O-Z plasty (double transposition flap) was deemed most appropriate.  Using a sterile surgical marker, the appropriate transposition flaps were drawn incorporating the defect and placing the expected incisions within the relaxed skin tension lines where possible. The area thus outlined was incised deep to adipose tissue with a #15 scalpel blade.  The skin margins were undermined to an appropriate distance in all directions utilizing iris scissors.  Hemostasis was achieved with electrocautery.  The flaps were then transposed into place, one clockwise and the other counterclockwise, and anchored with interrupted buried subcutaneous sutures.
Wound Care: Aquaphor
Bi-Rhombic Flap Text: The defect edges were debeveled with a #15 scalpel blade.  Given the location of the defect and the proximity to free margins a bi-rhombic flap was deemed most appropriate.  Using a sterile surgical marker, an appropriate rhombic flap was drawn incorporating the defect. The area thus outlined was incised deep to adipose tissue with a #15 scalpel blade.  The skin margins were undermined to an appropriate distance in all directions utilizing iris scissors.
O-L Flap Text: The defect edges were debeveled with a #15 scalpel blade.  Given the location of the defect, shape of the defect and the proximity to free margins an O-L flap was deemed most appropriate.  Using a sterile surgical marker, an appropriate advancement flap was drawn incorporating the defect and placing the expected incisions within the relaxed skin tension lines where possible.    The area thus outlined was incised deep to adipose tissue with a #15 scalpel blade.  The skin margins were undermined to an appropriate distance in all directions utilizing iris scissors.
W Plasty Text: The lesion was extirpated to the level of the fat with a #15 scalpel blade.  Given the location of the defect, shape of the defect and the proximity to free margins a W-plasty was deemed most appropriate for repair.  Using a sterile surgical marker, the appropriate transposition arms of the W-plasty were drawn incorporating the defect and placing the expected incisions within the relaxed skin tension lines where possible.    The area thus outlined was incised deep to adipose tissue with a #15 scalpel blade.  The skin margins were undermined to an appropriate distance in all directions utilizing iris scissors.  The opposing transposition arms were then transposed into place in opposite direction and anchored with interrupted buried subcutaneous sutures.
Partial Purse String (Simple) Text: Given the location of the defect and the characteristics of the surrounding skin a simple purse string closure was deemed most appropriate.  Undermining was performed circumfirentially around the surgical defect.  A purse string suture was then placed and tightened. Wound tension only allowed a partial closure of the circular defect.
Banner Transposition Flap Text: The defect edges were debeveled with a #15 scalpel blade.  Given the location of the defect and the proximity to free margins a Banner transposition flap was deemed most appropriate.  Using a sterile surgical marker, an appropriate flap drawn around the defect. The area thus outlined was incised deep to adipose tissue with a #15 scalpel blade.  The skin margins were undermined to an appropriate distance in all directions utilizing iris scissors.

## 2019-08-26 ENCOUNTER — APPOINTMENT (RX ONLY)
Dept: URBAN - METROPOLITAN AREA CLINIC 22 | Facility: CLINIC | Age: 74
Setting detail: DERMATOLOGY
End: 2019-08-26

## 2019-08-26 DIAGNOSIS — L90.5 SCAR CONDITIONS AND FIBROSIS OF SKIN: ICD-10-CM

## 2019-08-26 DIAGNOSIS — Z48.817 ENCOUNTER FOR SURGICAL AFTERCARE FOLLOWING SURGERY ON THE SKIN AND SUBCUTANEOUS TISSUE: ICD-10-CM

## 2019-08-26 PROCEDURE — ? POST-OP WOUND CHECK

## 2019-08-26 PROCEDURE — ? FRAXEL

## 2019-08-26 ASSESSMENT — LOCATION SIMPLE DESCRIPTION DERM: LOCATION SIMPLE: LEFT CHEEK

## 2019-08-26 ASSESSMENT — LOCATION ZONE DERM: LOCATION ZONE: FACE

## 2019-08-26 ASSESSMENT — LOCATION DETAILED DESCRIPTION DERM: LOCATION DETAILED: LEFT MEDIAL MANDIBULAR CHEEK

## 2019-08-26 NOTE — PROCEDURE: FRAXEL
Detail Level: Detailed
Indication: surgical scars
Treatment Number: 1
External Cooling Fan Speed: 5
Wavelength: 1550nm
Energy(Mj/Cm2): 70
Treatment Level: 4
Number Of Passes: 2
Consent: Written consent obtained, risks reviewed including but not limited to pain and incomplete improvement.
Add Post-Care Below To The Note: No
Post-Care Instructions: I reviewed with the patient in detail post-care instructions. Patient should avoid sun until area fully healed.

## 2019-08-26 NOTE — PROCEDURE: POST-OP WOUND CHECK
Additional Comments: Pt unable to tolerate full fraxel tx-2 passes completed.
Wound Evaluated By: Araceli Mason RN
Detail Level: Detailed
Add 08414 Cpt? (Important Note: In 2017 The Use Of 49037 Is Being Tracked By Cms To Determine Future Global Period Reimbursement For Global Periods): no

## 2021-01-13 DIAGNOSIS — Z23 NEED FOR VACCINATION: ICD-10-CM

## 2021-08-23 ENCOUNTER — APPOINTMENT (OUTPATIENT)
Dept: RADIOLOGY | Facility: MEDICAL CENTER | Age: 76
DRG: 871 | End: 2021-08-23
Attending: EMERGENCY MEDICINE
Payer: MEDICARE

## 2021-08-23 ENCOUNTER — HOSPITAL ENCOUNTER (INPATIENT)
Facility: MEDICAL CENTER | Age: 76
LOS: 3 days | DRG: 871 | End: 2021-08-26
Attending: EMERGENCY MEDICINE | Admitting: STUDENT IN AN ORGANIZED HEALTH CARE EDUCATION/TRAINING PROGRAM
Payer: MEDICARE

## 2021-08-23 DIAGNOSIS — A41.9 SEPSIS, DUE TO UNSPECIFIED ORGANISM, UNSPECIFIED WHETHER ACUTE ORGAN DYSFUNCTION PRESENT (HCC): ICD-10-CM

## 2021-08-23 DIAGNOSIS — N39.0 URINARY TRACT INFECTION WITHOUT HEMATURIA, SITE UNSPECIFIED: ICD-10-CM

## 2021-08-23 DIAGNOSIS — R41.82 ALTERED MENTAL STATUS, UNSPECIFIED ALTERED MENTAL STATUS TYPE: ICD-10-CM

## 2021-08-23 DIAGNOSIS — J96.21 ACUTE ON CHRONIC RESPIRATORY FAILURE WITH HYPOXIA (HCC): ICD-10-CM

## 2021-08-23 DIAGNOSIS — I10 ESSENTIAL HYPERTENSION: ICD-10-CM

## 2021-08-23 DIAGNOSIS — J44.9 CHRONIC OBSTRUCTIVE PULMONARY DISEASE, UNSPECIFIED COPD TYPE (HCC): ICD-10-CM

## 2021-08-23 DIAGNOSIS — A41.9 SEPSIS WITHOUT ACUTE ORGAN DYSFUNCTION, DUE TO UNSPECIFIED ORGANISM (HCC): ICD-10-CM

## 2021-08-23 PROBLEM — E87.20 LACTIC ACIDOSIS: Status: ACTIVE | Noted: 2021-08-23

## 2021-08-23 PROBLEM — E11.65 UNCONTROLLED DIABETES MELLITUS WITH HYPERGLYCEMIA (HCC): Status: ACTIVE | Noted: 2021-08-23

## 2021-08-23 PROBLEM — E78.5 HYPERLIPIDEMIA: Status: ACTIVE | Noted: 2021-08-23

## 2021-08-23 PROBLEM — Z79.01 CHRONIC ANTICOAGULATION: Status: ACTIVE | Noted: 2021-08-23

## 2021-08-23 LAB
ALBUMIN SERPL BCP-MCNC: 3.9 G/DL (ref 3.2–4.9)
ALBUMIN/GLOB SERPL: 1.2 G/DL
ALP SERPL-CCNC: 98 U/L (ref 30–99)
ALT SERPL-CCNC: 27 U/L (ref 2–50)
ANION GAP SERPL CALC-SCNC: 16 MMOL/L (ref 7–16)
APPEARANCE UR: ABNORMAL
APTT PPP: 34.8 SEC (ref 24.7–36)
AST SERPL-CCNC: 28 U/L (ref 12–45)
BACTERIA #/AREA URNS HPF: ABNORMAL /HPF
BASE EXCESS BLDA CALC-SCNC: 0 MMOL/L (ref -4–3)
BASOPHILS # BLD AUTO: 0.6 % (ref 0–1.8)
BASOPHILS # BLD: 0.1 K/UL (ref 0–0.12)
BILIRUB SERPL-MCNC: 0.5 MG/DL (ref 0.1–1.5)
BILIRUB UR QL STRIP.AUTO: NEGATIVE
BODY TEMPERATURE: ABNORMAL CENTIGRADE
BUN SERPL-MCNC: 22 MG/DL (ref 8–22)
CALCIUM SERPL-MCNC: 9 MG/DL (ref 8.5–10.5)
CHLORIDE SERPL-SCNC: 96 MMOL/L (ref 96–112)
CO2 SERPL-SCNC: 22 MMOL/L (ref 20–33)
COLOR UR: YELLOW
CREAT SERPL-MCNC: 1.28 MG/DL (ref 0.5–1.4)
EKG IMPRESSION: NORMAL
EOSINOPHIL # BLD AUTO: 0.03 K/UL (ref 0–0.51)
EOSINOPHIL NFR BLD: 0.2 % (ref 0–6.9)
EPI CELLS #/AREA URNS HPF: ABNORMAL /HPF
ERYTHROCYTE [DISTWIDTH] IN BLOOD BY AUTOMATED COUNT: 45.8 FL (ref 35.9–50)
FLUAV RNA SPEC QL NAA+PROBE: NEGATIVE
FLUBV RNA SPEC QL NAA+PROBE: NEGATIVE
GLOBULIN SER CALC-MCNC: 3.2 G/DL (ref 1.9–3.5)
GLUCOSE SERPL-MCNC: 311 MG/DL (ref 65–99)
GLUCOSE UR STRIP.AUTO-MCNC: 250 MG/DL
HCO3 BLDA-SCNC: 25 MMOL/L (ref 17–25)
HCT VFR BLD AUTO: 29.7 % (ref 37–47)
HGB BLD-MCNC: 10 G/DL (ref 12–16)
IMM GRANULOCYTES # BLD AUTO: 0.12 K/UL (ref 0–0.11)
IMM GRANULOCYTES NFR BLD AUTO: 0.8 % (ref 0–0.9)
INR PPP: 1.18 (ref 0.87–1.13)
KETONES UR STRIP.AUTO-MCNC: NEGATIVE MG/DL
LACTATE BLD-SCNC: 1.7 MMOL/L (ref 0.5–2)
LACTATE BLD-SCNC: 2.3 MMOL/L (ref 0.5–2)
LEUKOCYTE ESTERASE UR QL STRIP.AUTO: ABNORMAL
LYMPHOCYTES # BLD AUTO: 0.6 K/UL (ref 1–4.8)
LYMPHOCYTES NFR BLD: 3.8 % (ref 22–41)
MCH RBC QN AUTO: 28.2 PG (ref 27–33)
MCHC RBC AUTO-ENTMCNC: 33.7 G/DL (ref 33.6–35)
MCV RBC AUTO: 83.9 FL (ref 81.4–97.8)
MICRO URNS: ABNORMAL
MONOCYTES # BLD AUTO: 1.17 K/UL (ref 0–0.85)
MONOCYTES NFR BLD AUTO: 7.5 % (ref 0–13.4)
NEUTROPHILS # BLD AUTO: 13.6 K/UL (ref 2–7.15)
NEUTROPHILS NFR BLD: 87.1 % (ref 44–72)
NITRITE UR QL STRIP.AUTO: POSITIVE
NRBC # BLD AUTO: 0 K/UL
NRBC BLD-RTO: 0 /100 WBC
PCO2 BLDA: 42.2 MMHG (ref 26–37)
PH BLDA: 7.39 [PH] (ref 7.4–7.5)
PH UR STRIP.AUTO: 5.5 [PH] (ref 5–8)
PLATELET # BLD AUTO: 226 K/UL (ref 164–446)
PMV BLD AUTO: 10.2 FL (ref 9–12.9)
PO2 BLDA: 34.2 MMHG (ref 64–87)
POTASSIUM SERPL-SCNC: 4.3 MMOL/L (ref 3.6–5.5)
PROT SERPL-MCNC: 7.1 G/DL (ref 6–8.2)
PROT UR QL STRIP: 300 MG/DL
PROTHROMBIN TIME: 14.6 SEC (ref 12–14.6)
RBC # BLD AUTO: 3.54 M/UL (ref 4.2–5.4)
RBC # URNS HPF: ABNORMAL /HPF
RBC UR QL AUTO: ABNORMAL
RSV RNA SPEC QL NAA+PROBE: NEGATIVE
SAO2 % BLDA: 62.7 % (ref 93–99)
SARS-COV-2 RNA RESP QL NAA+PROBE: NOTDETECTED
SODIUM SERPL-SCNC: 134 MMOL/L (ref 135–145)
SP GR UR STRIP.AUTO: 1.01
SPECIMEN SOURCE: NORMAL
UROBILINOGEN UR STRIP.AUTO-MCNC: 0.2 MG/DL
WBC # BLD AUTO: 15.6 K/UL (ref 4.8–10.8)
WBC #/AREA URNS HPF: ABNORMAL /HPF

## 2021-08-23 PROCEDURE — 70450 CT HEAD/BRAIN W/O DYE: CPT

## 2021-08-23 PROCEDURE — 93010 ELECTROCARDIOGRAM REPORT: CPT | Performed by: STUDENT IN AN ORGANIZED HEALTH CARE EDUCATION/TRAINING PROGRAM

## 2021-08-23 PROCEDURE — 36415 COLL VENOUS BLD VENIPUNCTURE: CPT

## 2021-08-23 PROCEDURE — 700102 HCHG RX REV CODE 250 W/ 637 OVERRIDE(OP): Performed by: EMERGENCY MEDICINE

## 2021-08-23 PROCEDURE — 85610 PROTHROMBIN TIME: CPT

## 2021-08-23 PROCEDURE — 85025 COMPLETE CBC W/AUTO DIFF WBC: CPT

## 2021-08-23 PROCEDURE — 81001 URINALYSIS AUTO W/SCOPE: CPT

## 2021-08-23 PROCEDURE — 99285 EMERGENCY DEPT VISIT HI MDM: CPT

## 2021-08-23 PROCEDURE — 80053 COMPREHEN METABOLIC PANEL: CPT

## 2021-08-23 PROCEDURE — 700105 HCHG RX REV CODE 258: Performed by: EMERGENCY MEDICINE

## 2021-08-23 PROCEDURE — 84145 PROCALCITONIN (PCT): CPT

## 2021-08-23 PROCEDURE — 87040 BLOOD CULTURE FOR BACTERIA: CPT

## 2021-08-23 PROCEDURE — 71045 X-RAY EXAM CHEST 1 VIEW: CPT

## 2021-08-23 PROCEDURE — 73501 X-RAY EXAM HIP UNI 1 VIEW: CPT | Mod: LT

## 2021-08-23 PROCEDURE — 85730 THROMBOPLASTIN TIME PARTIAL: CPT

## 2021-08-23 PROCEDURE — A9270 NON-COVERED ITEM OR SERVICE: HCPCS | Performed by: EMERGENCY MEDICINE

## 2021-08-23 PROCEDURE — 93005 ELECTROCARDIOGRAM TRACING: CPT | Performed by: STUDENT IN AN ORGANIZED HEALTH CARE EDUCATION/TRAINING PROGRAM

## 2021-08-23 PROCEDURE — 82803 BLOOD GASES ANY COMBINATION: CPT

## 2021-08-23 PROCEDURE — 700111 HCHG RX REV CODE 636 W/ 250 OVERRIDE (IP): Performed by: EMERGENCY MEDICINE

## 2021-08-23 PROCEDURE — C9803 HOPD COVID-19 SPEC COLLECT: HCPCS | Performed by: EMERGENCY MEDICINE

## 2021-08-23 PROCEDURE — 96374 THER/PROPH/DIAG INJ IV PUSH: CPT

## 2021-08-23 PROCEDURE — 770006 HCHG ROOM/CARE - MED/SURG/GYN SEMI*

## 2021-08-23 PROCEDURE — 0241U HCHG SARS-COV-2 COVID-19 NFCT DS RESP RNA 4 TRGT MIC: CPT

## 2021-08-23 PROCEDURE — 83605 ASSAY OF LACTIC ACID: CPT

## 2021-08-23 RX ORDER — GLIPIZIDE 10 MG/1
10 TABLET ORAL DAILY
Status: ON HOLD | COMMUNITY
End: 2021-08-26

## 2021-08-23 RX ORDER — ACETAMINOPHEN 500 MG
1000 TABLET ORAL ONCE
Status: DISCONTINUED | OUTPATIENT
Start: 2021-08-23 | End: 2021-08-23

## 2021-08-23 RX ORDER — ACETAMINOPHEN 650 MG/1
650 SUPPOSITORY RECTAL ONCE
Status: COMPLETED | OUTPATIENT
Start: 2021-08-23 | End: 2021-08-23

## 2021-08-23 RX ORDER — FUROSEMIDE 20 MG/1
20 TABLET ORAL DAILY
Status: DISCONTINUED | OUTPATIENT
Start: 2021-08-24 | End: 2021-08-25

## 2021-08-23 RX ORDER — AMLODIPINE BESYLATE 5 MG/1
5 TABLET ORAL DAILY
COMMUNITY

## 2021-08-23 RX ORDER — CEFTRIAXONE 2 G/1
2 INJECTION, POWDER, FOR SOLUTION INTRAMUSCULAR; INTRAVENOUS ONCE
Status: COMPLETED | OUTPATIENT
Start: 2021-08-23 | End: 2021-08-23

## 2021-08-23 RX ORDER — TRAZODONE HYDROCHLORIDE 150 MG/1
150 TABLET ORAL NIGHTLY
COMMUNITY

## 2021-08-23 RX ORDER — POTASSIUM CHLORIDE 600 MG/1
8 TABLET, FILM COATED, EXTENDED RELEASE ORAL DAILY
COMMUNITY

## 2021-08-23 RX ORDER — PAROXETINE 30 MG/1
60 TABLET, FILM COATED ORAL DAILY
Status: DISCONTINUED | OUTPATIENT
Start: 2021-08-24 | End: 2021-08-26 | Stop reason: HOSPADM

## 2021-08-23 RX ORDER — PRAVASTATIN SODIUM 20 MG
20 TABLET ORAL DAILY
Status: DISCONTINUED | OUTPATIENT
Start: 2021-08-24 | End: 2021-08-26 | Stop reason: HOSPADM

## 2021-08-23 RX ORDER — SODIUM CHLORIDE, SODIUM LACTATE, POTASSIUM CHLORIDE, CALCIUM CHLORIDE 600; 310; 30; 20 MG/100ML; MG/100ML; MG/100ML; MG/100ML
INJECTION, SOLUTION INTRAVENOUS CONTINUOUS
Status: DISCONTINUED | OUTPATIENT
Start: 2021-08-23 | End: 2021-08-25

## 2021-08-23 RX ORDER — ALPRAZOLAM 1 MG/1
1 TABLET ORAL 3 TIMES DAILY PRN
COMMUNITY

## 2021-08-23 RX ORDER — ALPRAZOLAM 1 MG/1
1 TABLET ORAL 3 TIMES DAILY PRN
Status: DISCONTINUED | OUTPATIENT
Start: 2021-08-23 | End: 2021-08-26 | Stop reason: HOSPADM

## 2021-08-23 RX ORDER — GABAPENTIN 100 MG/1
100 CAPSULE ORAL DAILY
Status: DISCONTINUED | OUTPATIENT
Start: 2021-08-24 | End: 2021-08-26 | Stop reason: HOSPADM

## 2021-08-23 RX ORDER — AMLODIPINE BESYLATE 5 MG/1
5 TABLET ORAL DAILY
Status: DISCONTINUED | OUTPATIENT
Start: 2021-08-24 | End: 2021-08-26 | Stop reason: HOSPADM

## 2021-08-23 RX ORDER — FUROSEMIDE 20 MG/1
20 TABLET ORAL DAILY
COMMUNITY

## 2021-08-23 RX ORDER — LISINOPRIL 20 MG/1
20 TABLET ORAL 2 TIMES DAILY
Status: ON HOLD | COMMUNITY
End: 2021-08-26

## 2021-08-23 RX ORDER — PAROXETINE HYDROCHLORIDE 40 MG/1
60 TABLET, FILM COATED ORAL DAILY
COMMUNITY

## 2021-08-23 RX ORDER — GABAPENTIN 100 MG/1
100 CAPSULE ORAL DAILY
COMMUNITY

## 2021-08-23 RX ORDER — PRAVASTATIN SODIUM 20 MG
20 TABLET ORAL DAILY
COMMUNITY

## 2021-08-23 RX ORDER — LISINOPRIL 20 MG/1
20 TABLET ORAL 2 TIMES DAILY
Status: DISCONTINUED | OUTPATIENT
Start: 2021-08-23 | End: 2021-08-25

## 2021-08-23 RX ORDER — SODIUM CHLORIDE 9 MG/ML
1000 INJECTION, SOLUTION INTRAVENOUS ONCE
Status: COMPLETED | OUTPATIENT
Start: 2021-08-23 | End: 2021-08-23

## 2021-08-23 RX ADMIN — CEFTRIAXONE SODIUM 2 G: 2 INJECTION, POWDER, FOR SOLUTION INTRAMUSCULAR; INTRAVENOUS at 19:01

## 2021-08-23 RX ADMIN — ACETAMINOPHEN 650 MG: 650 SUPPOSITORY RECTAL at 18:40

## 2021-08-23 RX ADMIN — SODIUM CHLORIDE 1000 ML: 9 INJECTION, SOLUTION INTRAVENOUS at 21:15

## 2021-08-23 ASSESSMENT — ENCOUNTER SYMPTOMS
PSYCHIATRIC NEGATIVE: 1
CARDIOVASCULAR NEGATIVE: 1
NEUROLOGICAL NEGATIVE: 1
CHILLS: 1
MUSCULOSKELETAL NEGATIVE: 1
RESPIRATORY NEGATIVE: 1
EYES NEGATIVE: 1
FEVER: 1
GASTROINTESTINAL NEGATIVE: 1

## 2021-08-24 ENCOUNTER — PATIENT OUTREACH (OUTPATIENT)
Dept: HEALTH INFORMATION MANAGEMENT | Facility: OTHER | Age: 76
End: 2021-08-24

## 2021-08-24 LAB
GLUCOSE BLD-MCNC: 234 MG/DL (ref 65–99)
GLUCOSE BLD-MCNC: 240 MG/DL (ref 65–99)
GLUCOSE BLD-MCNC: 263 MG/DL (ref 65–99)
GLUCOSE BLD-MCNC: 308 MG/DL (ref 65–99)
GLUCOSE BLD-MCNC: 339 MG/DL (ref 65–99)
PROCALCITONIN SERPL-MCNC: 3.16 NG/ML

## 2021-08-24 PROCEDURE — A9270 NON-COVERED ITEM OR SERVICE: HCPCS | Performed by: STUDENT IN AN ORGANIZED HEALTH CARE EDUCATION/TRAINING PROGRAM

## 2021-08-24 PROCEDURE — 99233 SBSQ HOSP IP/OBS HIGH 50: CPT | Performed by: STUDENT IN AN ORGANIZED HEALTH CARE EDUCATION/TRAINING PROGRAM

## 2021-08-24 PROCEDURE — 700111 HCHG RX REV CODE 636 W/ 250 OVERRIDE (IP): Performed by: STUDENT IN AN ORGANIZED HEALTH CARE EDUCATION/TRAINING PROGRAM

## 2021-08-24 PROCEDURE — 700102 HCHG RX REV CODE 250 W/ 637 OVERRIDE(OP): Performed by: STUDENT IN AN ORGANIZED HEALTH CARE EDUCATION/TRAINING PROGRAM

## 2021-08-24 PROCEDURE — 82962 GLUCOSE BLOOD TEST: CPT | Mod: 91

## 2021-08-24 PROCEDURE — 700101 HCHG RX REV CODE 250: Performed by: STUDENT IN AN ORGANIZED HEALTH CARE EDUCATION/TRAINING PROGRAM

## 2021-08-24 PROCEDURE — 700105 HCHG RX REV CODE 258: Performed by: STUDENT IN AN ORGANIZED HEALTH CARE EDUCATION/TRAINING PROGRAM

## 2021-08-24 PROCEDURE — 770006 HCHG ROOM/CARE - MED/SURG/GYN SEMI*

## 2021-08-24 RX ORDER — CEFDINIR 300 MG/1
300 CAPSULE ORAL 2 TIMES DAILY
Qty: 4 CAPSULE | Refills: 0 | Status: SHIPPED | OUTPATIENT
Start: 2021-08-24 | End: 2021-08-26 | Stop reason: SDUPTHER

## 2021-08-24 RX ORDER — LABETALOL HYDROCHLORIDE 5 MG/ML
10 INJECTION, SOLUTION INTRAVENOUS EVERY 4 HOURS PRN
Status: DISCONTINUED | OUTPATIENT
Start: 2021-08-24 | End: 2021-08-26 | Stop reason: HOSPADM

## 2021-08-24 RX ORDER — HYDRALAZINE HYDROCHLORIDE 20 MG/ML
15 INJECTION INTRAMUSCULAR; INTRAVENOUS ONCE
Status: COMPLETED | OUTPATIENT
Start: 2021-08-24 | End: 2021-08-24

## 2021-08-24 RX ADMIN — LABETALOL HYDROCHLORIDE 10 MG: 5 INJECTION INTRAVENOUS at 22:21

## 2021-08-24 RX ADMIN — METOPROLOL TARTRATE 25 MG: 25 TABLET, FILM COATED ORAL at 17:50

## 2021-08-24 RX ADMIN — PAROXETINE 60 MG: 30 TABLET, FILM COATED ORAL at 05:44

## 2021-08-24 RX ADMIN — AMLODIPINE BESYLATE 5 MG: 5 TABLET ORAL at 05:43

## 2021-08-24 RX ADMIN — INSULIN HUMAN 12 UNITS: 100 INJECTION, SOLUTION PARENTERAL at 00:38

## 2021-08-24 RX ADMIN — LISINOPRIL 20 MG: 20 TABLET ORAL at 00:37

## 2021-08-24 RX ADMIN — HYDRALAZINE HYDROCHLORIDE 15 MG: 20 INJECTION INTRAMUSCULAR; INTRAVENOUS at 23:28

## 2021-08-24 RX ADMIN — TRAZODONE HYDROCHLORIDE 150 MG: 50 TABLET ORAL at 20:55

## 2021-08-24 RX ADMIN — INSULIN HUMAN 9 UNITS: 100 INJECTION, SOLUTION PARENTERAL at 05:59

## 2021-08-24 RX ADMIN — INSULIN HUMAN 12 UNITS: 100 INJECTION, SOLUTION PARENTERAL at 23:24

## 2021-08-24 RX ADMIN — SODIUM CHLORIDE, POTASSIUM CHLORIDE, SODIUM LACTATE AND CALCIUM CHLORIDE: 600; 310; 30; 20 INJECTION, SOLUTION INTRAVENOUS at 00:40

## 2021-08-24 RX ADMIN — GABAPENTIN 100 MG: 100 CAPSULE ORAL at 05:43

## 2021-08-24 RX ADMIN — LABETALOL HYDROCHLORIDE 10 MG: 5 INJECTION INTRAVENOUS at 16:14

## 2021-08-24 RX ADMIN — LISINOPRIL 20 MG: 20 TABLET ORAL at 17:50

## 2021-08-24 RX ADMIN — INSULIN HUMAN 6 UNITS: 100 INJECTION, SOLUTION PARENTERAL at 16:37

## 2021-08-24 RX ADMIN — METOPROLOL TARTRATE 25 MG: 25 TABLET, FILM COATED ORAL at 00:36

## 2021-08-24 RX ADMIN — FUROSEMIDE 20 MG: 20 TABLET ORAL at 05:43

## 2021-08-24 RX ADMIN — CEFTRIAXONE SODIUM 2 G: 2 INJECTION, POWDER, FOR SOLUTION INTRAMUSCULAR; INTRAVENOUS at 05:43

## 2021-08-24 RX ADMIN — INSULIN HUMAN 6 UNITS: 100 INJECTION, SOLUTION PARENTERAL at 12:12

## 2021-08-24 RX ADMIN — PRAVASTATIN SODIUM 20 MG: 20 TABLET ORAL at 05:43

## 2021-08-24 RX ADMIN — TRAZODONE HYDROCHLORIDE 150 MG: 50 TABLET ORAL at 00:37

## 2021-08-24 ASSESSMENT — LIFESTYLE VARIABLES
TOTAL SCORE: 0
EVER FELT BAD OR GUILTY ABOUT YOUR DRINKING: NO
TOTAL SCORE: 0
CONSUMPTION TOTAL: NEGATIVE
HAVE PEOPLE ANNOYED YOU BY CRITICIZING YOUR DRINKING: NO
DOES PATIENT WANT TO STOP DRINKING: NO
HAVE YOU EVER FELT YOU SHOULD CUT DOWN ON YOUR DRINKING: NO
AVERAGE NUMBER OF DAYS PER WEEK YOU HAVE A DRINK CONTAINING ALCOHOL: 0
ON A TYPICAL DAY WHEN YOU DRINK ALCOHOL HOW MANY DRINKS DO YOU HAVE: 0
ALCOHOL_USE: NO
EVER HAD A DRINK FIRST THING IN THE MORNING TO STEADY YOUR NERVES TO GET RID OF A HANGOVER: NO
TOTAL SCORE: 0
HOW MANY TIMES IN THE PAST YEAR HAVE YOU HAD 5 OR MORE DRINKS IN A DAY: 0

## 2021-08-24 ASSESSMENT — COGNITIVE AND FUNCTIONAL STATUS - GENERAL
WALKING IN HOSPITAL ROOM: A LITTLE
TOILETING: A LITTLE
STANDING UP FROM CHAIR USING ARMS: A LITTLE
MOBILITY SCORE: 18
MOVING TO AND FROM BED TO CHAIR: A LITTLE
TURNING FROM BACK TO SIDE WHILE IN FLAT BAD: A LITTLE
SUGGESTED CMS G CODE MODIFIER MOBILITY: CK
DRESSING REGULAR LOWER BODY CLOTHING: A LITTLE
CLIMB 3 TO 5 STEPS WITH RAILING: A LITTLE
DAILY ACTIVITIY SCORE: 21
SUGGESTED CMS G CODE MODIFIER DAILY ACTIVITY: CJ
HELP NEEDED FOR BATHING: A LITTLE
MOVING FROM LYING ON BACK TO SITTING ON SIDE OF FLAT BED: A LITTLE

## 2021-08-24 ASSESSMENT — PATIENT HEALTH QUESTIONNAIRE - PHQ9
SUM OF ALL RESPONSES TO PHQ9 QUESTIONS 1 AND 2: 0
2. FEELING DOWN, DEPRESSED, IRRITABLE, OR HOPELESS: NOT AT ALL
1. LITTLE INTEREST OR PLEASURE IN DOING THINGS: NOT AT ALL

## 2021-08-24 ASSESSMENT — PAIN DESCRIPTION - PAIN TYPE
TYPE: ACUTE PAIN;CHRONIC PAIN
TYPE: ACUTE PAIN;CHRONIC PAIN

## 2021-08-24 NOTE — ASSESSMENT & PLAN NOTE
This is Sepsis Present on admission  SIRS criteria identified on my evaluation include: Fever, with temperature greater than 101 deg F  Source is UTI  Sepsis protocol initiated  Fluid resuscitation ordered per protocol  IV antibiotics as appropriate for source of sepsis  While organ dysfunction may be noted elsewhere in this problem list or in the chart, degree of organ dysfunction does not meet CMS criteria for severe sepsis  resolved

## 2021-08-24 NOTE — H&P
Hospital Medicine History & Physical Note    Date of Service  8/23/2021    Primary Care Physician  No primary care provider on file.    Consultants  None    Code Status  Full Code    Chief Complaint  Chief Complaint   Patient presents with   • Fall     fell off chair, per ems  witnessed and did not hit head   • Fatigue     exposed to son who tested + covid   • Altered Mental Status   • Fever       History of Presenting Illness  Josefina Carmona is a 76 y.o. female who presented 8/23/2021 with altered mental status.  Patient woke up this afternoon, she states that it is difficult for her to ambulate to the bathroom.  She felt her legs were very weak and he felt like it took an hour to get to the bottom.  She states that she did not remember everything that happened prior to admission.  Denies fall.  Denies dysuria.     Patient states that she has a history of hypertension and COPD she used to smoke heavily however states that she quit smoking several years ago.      In ED, Found to be febrile and tachycardic, other vital signs within normal limits..  Initially per ED she was altered and unable provide a clear history however after diuretics and fluids were administered, her mental status improved.  Remarkable labs include neutrophilic leukocytosis, sugar 311, lactic acid 2.3.  UA suggestive of UTI.        I discussed the plan of care with patient.    Review of Systems  Review of Systems   Constitutional: Positive for chills, fever and malaise/fatigue.   HENT: Negative.    Eyes: Negative.    Respiratory: Negative.    Cardiovascular: Negative.    Gastrointestinal: Negative.    Genitourinary: Negative.    Musculoskeletal: Negative.    Skin: Negative.    Neurological: Negative.    Endo/Heme/Allergies: Negative.    Psychiatric/Behavioral: Negative.          Past Medical History  COPD hypertension    Surgical History  No pertinent surgical history    Family History   Family history reviewed with patient. There  is no family history that is pertinent to the chief complaint.     Social History   As per HPI    Allergies  No Known Allergies    Medications  None       Physical Exam  Temp:  [38.5 °C (101.3 °F)-40.4 °C (104.7 °F)] 38.5 °C (101.3 °F)  Pulse:  [92-95] 95  Resp:  [26-30] 30  BP: (174-208)/(76-91) 174/76  SpO2:  [92 %-95 %] 94 %    Physical Exam  Constitutional:       Appearance: Normal appearance. She is normal weight.   HENT:      Head: Normocephalic.      Nose: Nose normal.      Mouth/Throat:      Mouth: Mucous membranes are moist.   Eyes:      Pupils: Pupils are equal, round, and reactive to light.   Cardiovascular:      Rate and Rhythm: Regular rhythm. Tachycardia present.      Pulses: Normal pulses.   Pulmonary:      Effort: Pulmonary effort is normal.      Breath sounds: Normal breath sounds.   Abdominal:      General: Abdomen is flat.      Palpations: Abdomen is soft.      Comments: Endorses abdominal pain upon palpation of right lower quadrant  No rigidity no rebound tenderness     Musculoskeletal:         General: Normal range of motion.      Cervical back: Normal range of motion.   Skin:     General: Skin is warm.   Neurological:      General: No focal deficit present.      Mental Status: She is alert and oriented to person, place, and time. Mental status is at baseline.   Psychiatric:         Mood and Affect: Mood normal.           Laboratory:  Recent Labs     08/23/21 1827   WBC 15.6*   RBC 3.54*   HEMOGLOBIN 10.0*   HEMATOCRIT 29.7*   MCV 83.9   MCH 28.2   MCHC 33.7   RDW 45.8   PLATELETCT 226   MPV 10.2     Recent Labs     08/23/21 1827   SODIUM 134*   POTASSIUM 4.3   CHLORIDE 96   CO2 22   GLUCOSE 311*   BUN 22   CREATININE 1.28   CALCIUM 9.0     Recent Labs     08/23/21 1827   ALTSGPT 27   ASTSGOT 28   ALKPHOSPHAT 98   TBILIRUBIN 0.5   GLUCOSE 311*     Recent Labs     08/23/21 1827   APTT 34.8   INR 1.18*     No results for input(s): NTPROBNP in the last 72 hours.      No results for input(s):  TROPONINT in the last 72 hours.    Imaging:  CT-HEAD W/O   Final Result      No noncontrast CT evidence of acute intracranial hemorrhage.      Left mastoid and middle ear opacification is nonspecific and could be chronic related to effusion versus acute related to otitis media and mastoiditis. No displaced fracture is seen to indicate hemorrhage but recommend clinical correlation as this is in    the differential      Mild white matter hypodensity is present.  This is a nonspecific finding which usually is found to represent chronic microvascular disease in patient's of this demographic.  Demyelination, age indeterminant ischemia and gliosis are also common    possibilities.         DX-HIP-UNILATERAL-WITH PELVIS-1 VIEW LEFT   Final Result      No radiographic evidence of acute traumatic injury.      DX-CHEST-PORTABLE (1 VIEW)   Final Result      1.  Left basilar opacity may represent a small pleural effusion with overlying atelectasis/consolidation.   2.  Mild pulmonary vascular congestion.   3.  Left upper lobe parenchymal scarring.   4.  Atherosclerotic plaque.          no X-Ray or EKG requiring interpretation    Assessment/Plan:  I anticipate this patient will require at least two midnights for appropriate medical management, necessitating inpatient admission.    * Sepsis (HCC)  Assessment & Plan  This is Sepsis Present on admission  SIRS criteria identified on my evaluation include: Fever, with temperature greater than 101 deg F  Source is UTI  Sepsis protocol initiated  Fluid resuscitation ordered per protocol  IV antibiotics as appropriate for source of sepsis  While organ dysfunction may be noted elsewhere in this problem list or in the chart, degree of organ dysfunction does not meet CMS criteria for severe sepsis          Chronic anticoagulation  Assessment & Plan  States that she takes xarelto, but is unclear why  EKG shows NSR  Hold Xarelto for now. Would speak with PCP in AM for reason why she is taking  xarelto     Uncontrolled diabetes mellitus with hyperglycemia ([facility])  Assessment & Plan  Insulin ED   POCT Q6H    Hyperlipidemia  Assessment & Plan  Continue pravastatin 20 mg p.o. daily    Hypertension  Assessment & Plan  Continue Norvasc 5 mg p.o. daily  Continue lisinopril 20 mg p.o. daily   continue Lopressor 25 mg p.o. twice daily      COPD (chronic obstructive pulmonary disease) ([facility])  Assessment & Plan  Lungs clear upon auscultation   Duo nebs as needed      Lactic acidosis  Assessment & Plan  Lactic acid 2.3, trend  Fluids  Antibiotics     Acute UTI  Assessment & Plan  Admit patient to medical floor  CT head negative  Rocephin   Follow blood cultures   Follow Urine cultures  Fluids       VTE prophylaxis: heparin ppx

## 2021-08-24 NOTE — ED PROVIDER NOTES
ED Provider Note    ER PROVIDER NOTE    CHIEF COMPLAINT  Chief Complaint   Patient presents with   • Fall     fell off chair, per ems  witnessed and did not hit head   • Fatigue     exposed to son who tested + covid   • Altered Mental Status   • Fever       HPI  Sherry Grisell is a 76 y.o. female who presents to the emergency department complaining of altered mental status and body aches.  Patient's history is limited as she is confused.  She reports she is having pain in many places but cannot specify any further.  She does know her name, knows that she is in [place] although at a different hospital she thinks, states year is 2002.  No real other history is obtainable.  When asked directly about chest pain or headaches or abdominal pain or vomiting she states no but this history is limited.    EMS report, patient has been confused, noted to be febrile, and did fall off a chair which was witnessed and she did not hit her head.  Unclear if she is vaccinated, her son did recently visit who tested positive for Covid    Other history is limited due to patient's current mental status    REVIEW OF SYSTEMS  Pertinent positives include fever, confusion. Pertinent negatives include no vomiting. See HPI for details. All other systems reviewed and are negative.    PAST MEDICAL HISTORY       SURGICAL HISTORY  patient denies any surgical history    FAMILY HISTORY  No family history on file.    SOCIAL HISTORY  Social History     Socioeconomic History   • Marital status: Not on file     Spouse name: Not on file   • Number of children: Not on file   • Years of education: Not on file   • Highest education level: Not on file   Occupational History   • Not on file   Tobacco Use   • Smoking status: Not on file   Substance and Sexual Activity   • Alcohol use: Not on file   • Drug use: Not on file   • Sexual activity: Not on file   Other Topics Concern   • Not on file   Social History Narrative   • Not on file     Social Determinants  "of Health     Financial Resource Strain:    • Difficulty of Paying Living Expenses:    Food Insecurity:    • Worried About Running Out of Food in the Last Year:    • Ran Out of Food in the Last Year:    Transportation Needs:    • Lack of Transportation (Medical):    • Lack of Transportation (Non-Medical):    Physical Activity:    • Days of Exercise per Week:    • Minutes of Exercise per Session:    Stress:    • Feeling of Stress :    Social Connections:    • Frequency of Communication with Friends and Family:    • Frequency of Social Gatherings with Friends and Family:    • Attends Bahai Services:    • Active Member of Clubs or Organizations:    • Attends Club or Organization Meetings:    • Marital Status:    Intimate Partner Violence:    • Fear of Current or Ex-Partner:    • Emotionally Abused:    • Physically Abused:    • Sexually Abused:       Social History     Substance and Sexual Activity   Drug Use Not on file       CURRENT MEDICATIONS  Home Medications    **Home medications have not yet been reviewed for this encounter**         ALLERGIES  No Known Allergies    PHYSICAL EXAM  VITAL SIGNS: BP (!) 174/76   Pulse 95   Temp (!) 38.5 °C (101.3 °F) (Temporal)   Resp (!) 30   Ht 1.778 m (5' 10\")   Wt 90.7 kg (200 lb)   SpO2 94%   BMI 28.70 kg/m²   Pulse ox interpretation: She is on 5 L oxygen.    Constitutional: Mildly somnolent, ill-appearing  HENT: No signs of trauma, Bilateral external ears normal, Nose normal.   Eyes: Pupils are equal and reactive 3mm, Conjunctiva normal, Non-icteric.   Neck: Normal range of motion, No tenderness, Supple, No stridor.   Lymphatic: No lymphadenopathy noted.   Cardiovascular: Tachycardic, regular, no murmurs.   Thorax & Lungs: Scattered rhonchi, no respiratory distress, No wheezing, No chest tenderness.   Abdomen: Bowel sounds normal, Soft, No tenderness, No masses, No pulsatile masses. No peritoneal signs.  Skin: Warm, Dry, No erythema, No rash.   Back: No bony " tenderness, No CVA tenderness.   Extremities: Intact distal pulses, No edema, No tenderness, No cyanosis,Negative Carina's sign.  Musculoskeletal: Good range of motion in all major joints. No tenderness to palpation or major deformities noted.   Neurologic: Patient is mildly sleepy but easily arousable to verbal stimuli, she is conversant, with clear speech, she is moving all 4 extremities  Psychiatric: Confused     DIAGNOSTIC STUDIES / PROCEDURES    LABS  Labs Reviewed   CBC WITH DIFFERENTIAL - Abnormal; Notable for the following components:       Result Value    WBC 15.6 (*)     RBC 3.54 (*)     Hemoglobin 10.0 (*)     Hematocrit 29.7 (*)     Neutrophils-Polys 87.10 (*)     Lymphocytes 3.80 (*)     Neutrophils (Absolute) 13.60 (*)     Lymphs (Absolute) 0.60 (*)     Monos (Absolute) 1.17 (*)     Immature Granulocytes (abs) 0.12 (*)     All other components within normal limits    Narrative:     Droplet,Contact  Indicate which anticoagulants the patient is on:->ARGATROBAN   COMP METABOLIC PANEL - Abnormal; Notable for the following components:    Sodium 134 (*)     Glucose 311 (*)     All other components within normal limits    Narrative:     Droplet,Contact  Indicate which anticoagulants the patient is on:->ARGATROBAN   LACTIC ACID - Abnormal; Notable for the following components:    Lactic Acid 2.3 (*)     All other components within normal limits    Narrative:     Droplet,Contact   ARTERIAL BLOOD GAS - Abnormal; Notable for the following components:    Ph 7.39 (*)     Pco2 42.2 (*)     Po2 34.2 (*)     O2 Saturation 62.7 (*)     All other components within normal limits    Narrative:     Droplet,Contact   URINALYSIS - Abnormal; Notable for the following components:    Character Cloudy (*)     Glucose 250 (*)     Protein 300 (*)     Nitrite Positive (*)     Leukocyte Esterase Moderate (*)     Occult Blood Small (*)     All other components within normal limits    Narrative:     Droplet,Contact   PROTHROMBIN TIME -  Abnormal; Notable for the following components:    INR 1.18 (*)     All other components within normal limits    Narrative:     Droplet,Contact  Indicate which anticoagulants the patient is on:->ARGATROBAN   URINE MICROSCOPIC (W/UA) - Abnormal; Notable for the following components:    WBC Packed (*)     Bacteria Many (*)     All other components within normal limits    Narrative:     Droplet,Contact   ESTIMATED GFR - Abnormal; Notable for the following components:    GFR If  49 (*)     GFR If Non  41 (*)     All other components within normal limits    Narrative:     Droplet,Contact  Indicate which anticoagulants the patient is on:->ARGATROBAN   COV-2, FLU A/B, AND RSV BY PCR    Narrative:     Droplet,Contact  Have you been in close contact with a person who is suspected  or known to be positive for COVID-19 within the last 30 days  (e.g. last seen that person < 30 days ago)->Yes   PROCALCITONIN    Narrative:     Droplet,Contact   APTT    Narrative:     Droplet,Contact  Indicate which anticoagulants the patient is on:->ARGATROBAN   LACTIC ACID   LACTIC ACID   BLOOD CULTURE   BLOOD CULTURE   VENOUS BLOOD GAS       All labs reviewed by me.    RADIOLOGY  CT-HEAD W/O   Final Result      No noncontrast CT evidence of acute intracranial hemorrhage.      Left mastoid and middle ear opacification is nonspecific and could be chronic related to effusion versus acute related to otitis media and mastoiditis. No displaced fracture is seen to indicate hemorrhage but recommend clinical correlation as this is in    the differential      Mild white matter hypodensity is present.  This is a nonspecific finding which usually is found to represent chronic microvascular disease in patient's of this demographic.  Demyelination, age indeterminant ischemia and gliosis are also common    possibilities.         DX-HIP-UNILATERAL-WITH PELVIS-1 VIEW LEFT   Final Result      No radiographic evidence of acute  traumatic injury.      DX-CHEST-PORTABLE (1 VIEW)   Final Result      1.  Left basilar opacity may represent a small pleural effusion with overlying atelectasis/consolidation.   2.  Mild pulmonary vascular congestion.   3.  Left upper lobe parenchymal scarring.   4.  Atherosclerotic plaque.        The radiologist's interpretation of all radiological studies have been reviewed and images independently viewed by me.    COURSE & MEDICAL DECISION MAKING  Nursing notes, VS, PMSFHx reviewed in chart.    6:42 PM Patient seen and examined at bedside. Patient will be treated with Rocephin, Tylenol. Ordered for abscess bundle, CT head, to evaluate her symptoms.     7:34 PM  Patient is reevaluated, she does appear more comfortable, pending results    8:33 PM  Patient is reevaluated again, she is much more clear, states she has been having some intermittent pain L flank, pain with urination as well    I looked in her left ear as well, there is some fluid behind the tympanic membrane and it is erythematous, she has no pain with palpation to the mastoid or bogginess    8:45 PM  Urinalysis has returned, updated her on all findings plan for hospitalization to which she is agreeable    I discussed case with Dr. Irizarry for hospitalization    HYDRATION: Based on the patient's presentation of Sepsis the patient was given IV fluids. IV Hydration was used because oral hydration was not as rapid as required. Upon recheck following hydration, the patient was improved.    Decision Making:  This is a 76 y.o. female presenting with altered mental status, balance fever.  Her mental status has improved greatly with antipyretics, and she does appear to be septic from a urinary source.  She is started on Rocephin, initial fluid resuscitation, she has had no hypotension and her lactate is 2.3.  Covid is negative, she has had some slight cough, although no obvious pneumonia on her x-ray.  As patient became less confused, I think this is likely due  to improvement in her fever, she was reporting some flank pain and dysuria as well as fever, consistent with sepsis from a urinary source.  Imaging of the hip was unremarkable.  I did obtain a CT of her head with her initial confusion after being on Xarelto, no evidence of intracranial bleed, there is some question of potential ear infection/mastoiditis.  While there is some fluid behind her ear, her mastoid is nontender and there is no bogginess      Patient is admitted in guarded condition    FINAL IMPRESSION  1. Sepsis, due to unspecified organism, unspecified whether acute organ dysfunction present (HCC)    2. Urinary tract infection without hematuria, site unspecified    3. Altered mental status, unspecified altered mental status type       The total critical care time spent on this patient was 40 minutes, resuscitating patient, speaking with admitting physician, and interpreting test results. This 40 minutes is exclusive of separately billable procedures.           The note accurately reflects work and decisions made by me.  Sly Mercado M.D.  8/23/2021  8:54 PM

## 2021-08-24 NOTE — ED NOTES
Med Rec complete per Pt's family and list provided.  Allergies reviewed.  No oral ABX in the last 14 days.

## 2021-08-24 NOTE — DISCHARGE SUMMARY
Discharge Summary    CHIEF COMPLAINT ON ADMISSION  Chief Complaint   Patient presents with   • Fall     fell off chair, per ems  witnessed and did not hit head   • Fatigue     exposed to son who tested + covid   • Altered Mental Status   • Fever       Reason for Admission  EMS     Admission Date  8/23/2021    CODE STATUS  Full Code    HPI & HOSPITAL COURSE  Josefina Carmona is a 76 y.o. female who presented 8/23/2021 with altered mental status.  Patient woke up this afternoon, she states that it is difficult for her to ambulate to the bathroom.  She felt her legs were very weak and he felt like it took an hour to get to the bottom.  She states that she did not remember everything that happened prior to admission.  Denies fall.  Denies dysuria.      Patient states that she has a history of hypertension and COPD she used to smoke heavily however states that she quit smoking several years ago.       In ED, Found to be febrile and tachycardic, other vital signs within normal limits..  Initially per ED she was altered and unable provide a clear history however after diuretics and fluids were administered, her mental status improved.  Remarkable labs include neutrophilic leukocytosis, sugar 311, lactic acid 2.3.  UA suggestive of UTI.    8/24: Patient reports feeling much better today.   at bedside he also reports that she seems closer to her baseline.  On physical examination patient is awake and alert no signs of distress or tenderness.  She has tolerated the antibiotics well.  She will discharge home on Omnicef for 2 days.  She is received 2 doses of Rocephin.     Therefore, she is discharged in good and stable condition to home with close outpatient follow-up.    The patient recovered much more quickly than anticipated on admission.    Discharge Date  8/24/2021    FOLLOW UP ITEMS POST DISCHARGE  Acute UTI    DISCHARGE DIAGNOSES  Principal Problem:    Sepsis (HCC) POA: Unknown  Active Problems:     Acute UTI POA: Unknown    Lactic acidosis POA: Unknown    COPD (chronic obstructive pulmonary disease) ([facility]) POA: Unknown    Hypertension POA: Unknown    Hyperlipidemia POA: Unknown    Uncontrolled diabetes mellitus with hyperglycemia ([facility]) POA: Unknown    Chronic anticoagulation POA: Unknown  Resolved Problems:    * No resolved hospital problems. *      FOLLOW UP  No future appointments.  No follow-up provider specified.    MEDICATIONS ON DISCHARGE     Medication List      START taking these medications      Instructions   cefdinir 300 MG Caps  Commonly known as: OMNICEF   Take 1 Capsule by mouth 2 times a day for 2 days.  Dose: 300 mg        CONTINUE taking these medications      Instructions   albuterol 2.5mg/0.5ml Nebu  Commonly known as: PROVENTIL   Take 2.5 mg by nebulization every four hours as needed for Shortness of Breath.  Dose: 2.5 mg     ALPRAZolam 1 MG Tabs  Commonly known as: XANAX   Take 1 mg by mouth 3 times a day as needed for Sleep.  Dose: 1 mg     amLODIPine 5 MG Tabs  Commonly known as: NORVASC   Take 5 mg by mouth every day.  Dose: 5 mg     furosemide 20 MG Tabs  Commonly known as: LASIX   Take 20 mg by mouth every day.  Dose: 20 mg     gabapentin 100 MG Caps  Commonly known as: NEURONTIN   Take 100 mg by mouth every day.  Dose: 100 mg     glipiZIDE 10 MG Tabs  Commonly known as: GLUCOTROL   Take 10 mg by mouth every day.  Dose: 10 mg     Klor-Con 8 MEQ tablet  Generic drug: potassium chloride   Take 8 mEq by mouth every day.  Dose: 8 mEq     lisinopril 20 MG Tabs  Commonly known as: PRINIVIL   Take 20 mg by mouth 2 times a day.  Dose: 20 mg     metFORMIN 500 MG Tabs  Commonly known as: GLUCOPHAGE   Take 500 mg by mouth 2 times a day.  Dose: 500 mg     metoprolol tartrate 25 MG Tabs  Commonly known as: LOPRESSOR   Take 25 mg by mouth 2 times a day.  Dose: 25 mg     paroxetine 40 MG tablet  Commonly known as: PAXIL   Take 60 mg by mouth every day.  Dose: 60 mg     pravastatin 20 MG  Tabs  Commonly known as: PRAVACHOL   Take 20 mg by mouth every day.  Dose: 20 mg     traZODone 150 MG Tabs  Commonly known as: DESYREL   Take 150 mg by mouth every evening.  Dose: 150 mg     Xarelto 20 MG Tabs tablet  Generic drug: rivaroxaban   Take 20 mg by mouth with dinner.  Dose: 20 mg            Allergies  No Known Allergies    DIET  Orders Placed This Encounter   Procedures   • Diet Order Diet: Consistent CHO (Diabetic)     Standing Status:   Standing     Number of Occurrences:   1     Order Specific Question:   Diet:     Answer:   Consistent CHO (Diabetic) [4]       ACTIVITY  As tolerated.  Weight bearing as tolerated    CONSULTATIONS  None    PROCEDURES  None    LABORATORY  Lab Results   Component Value Date    SODIUM 134 (L) 08/23/2021    POTASSIUM 4.3 08/23/2021    CHLORIDE 96 08/23/2021    CO2 22 08/23/2021    GLUCOSE 311 (H) 08/23/2021    BUN 22 08/23/2021    CREATININE 1.28 08/23/2021        Lab Results   Component Value Date    WBC 15.6 (H) 08/23/2021    HEMOGLOBIN 10.0 (L) 08/23/2021    HEMATOCRIT 29.7 (L) 08/23/2021    PLATELETCT 226 08/23/2021        Total time of the discharge process exceeds 34 minutes.

## 2021-08-24 NOTE — PROGRESS NOTES
4 Eyes Skin Assessment Completed by CONNER Francois and CONNER De Anda.    Head WDL  Ears WDL  Nose WDL  Mouth WDL  Neck WDL  Breast/Chest Redness and Blanching, under left breast  Shoulder Blades WDL  Spine Redness and Blanching  (R) Arm/Elbow/Hand WDL  (L) Arm/Elbow/Hand WDL  Abdomen WDL  Groin Redness and Blanching  Scrotum/Coccyx/Buttocks Redness and Excoriation, slow to jennifer  (R) Leg Scab and Bruising  (L) Leg Scab and Bruising  (R) Heel/Foot/Toe WDL  (L) Heel/Foot/Toe WDL          Devices In Places Pulse Ox and SCD's, purwick      Interventions In Place Sacral Mepilex, Waffle Overlay, Pillows, Q2 Turns and Barrier Cream    Possible Skin Injury Yes    Pictures Uploaded Into Epic Yes  Wound Consult Placed Yes  RN Wound Prevention Protocol Ordered Yes

## 2021-08-24 NOTE — ASSESSMENT & PLAN NOTE
Continue Norvasc 5 mg p.o. daily  Continue lisinopril 20 mg p.o. daily   continue Lopressor 25 mg p.o. twice daily

## 2021-08-24 NOTE — CARE PLAN
The patient is Watcher - Medium risk of patient condition declining or worsening    Shift Goals  Clinical Goals: Rest, reorientation  Patient Goals: Rest    Progress made toward(s) clinical / shift goals:  Oriented patient to surroundings and instructed on use of call light. Reiterated to call for assistance when desiring to get up. Discussed importance of preventative skin measures with frequent repositioning. Patient indicated understanding, but reinforcement is necessary. The bed is locked in the lowest position, with the call light and belongings within reach. Bed alarm and non-slip socks on.     Problem: Knowledge Deficit - Standard  Goal: Patient and family/care givers will demonstrate understanding of plan of care, disease process/condition, diagnostic tests and medications  Outcome: Progressing     Problem: Skin Integrity  Goal: Skin integrity is maintained or improved  Outcome: Progressing     Problem: Fall Risk  Goal: Patient will remain free from falls  Outcome: Progressing

## 2021-08-24 NOTE — PROGRESS NOTES
4 Eyes Skin Assessment Completed by Lucia RN and CONNER Rosas.    Head WDL  Ears WDL  Nose WDL  Mouth WDL  Neck WDL  Breast/Chest WDL  Shoulder Blades WDL  Spine WDL  (R) Arm/Elbow/Hand WDL  (L) Arm/Elbow/Hand WDL  Abdomen WDL  Groin WDL  Scrotum/Coccyx/Buttocks Redness and Blanching  (R) Leg WDL  (L) Leg -- L lateral thigh with scattered small bumps, flaky but intact.  (R) Heel/Foot/Toe WDL  (L) Heel/Foot/Toe WDL          Devices In Places SCD's and Nasal Cannula      Interventions In Place Waffle Overlay, Pillows and Low Air Loss Mattress    Possible Skin Injury No    Pictures Uploaded Into Epic N/A  Wound Consult Placed N/A  RN Wound Prevention Protocol Ordered No

## 2021-08-24 NOTE — ED TRIAGE NOTES
Chief Complaint   Patient presents with   • Fall     fell off chair, per ems  witnessed and did not hit head   • Fatigue     exposed to son who tested + covid   • Altered Mental Status   • Fever     Pt bib ems with above complaints. Per report , initial call was for fall , pt on Xarelto but did not hit head.   Pt has been getting weaker past couple days , a/o x2.   Sepsis score=5, protocol initiated.  fsbs 355

## 2021-08-24 NOTE — CARE PLAN
Problem: Skin Integrity  Goal: Skin integrity is maintained or improved  Outcome: Progressing     Problem: Fall Risk  Goal: Patient will remain free from falls  Outcome: Progressing     Problem: Urinary Elimination  Goal: Establish and maintain regular urinary output  Outcome: Progressing   The patient is Watcher - Medium risk of patient condition declining or worsening    Shift Goals  Clinical Goals: reorient, comfort  Patient Goals: comfort    Progress made toward(s) clinical / shift goals:  Patient has some confusion on place this morning. Purwick in place. Q2 turns in place and encouraging patient to move in bed. Pending PT/OT evaluation.    Patient is not progressing towards the following goals:

## 2021-08-24 NOTE — PROGRESS NOTES
[facility] Medicine Daily Progress Note    Date of Service  8/25/2021    Chief Complaint  Josefina Carmona is a 76 y.o. female admitted 8/23/2021 with altered mental status    Hospital Course  Josefina Carmona is a 76 y.o. female who presented 8/23/2021 with altered mental status.  Patient woke up this afternoon, she states that it is difficult for her to ambulate to the bathroom.  She felt her legs were very weak and he felt like it took an hour to get to the bottom.  She states that she did not remember everything that happened prior to admission.  Denies fall.  Denies dysuria.      Patient states that she has a history of hypertension and COPD she used to smoke heavily however states that she quit smoking several years ago.       In ED, Found to be febrile and tachycardic, other vital signs within normal limits..  Initially per ED she was altered and unable provide a clear history however after diuretics and fluids were administered, her mental status improved.  Remarkable labs include neutrophilic leukocytosis, sugar 311, lactic acid 2.3.  UA suggestive of UTI.         Interval Problem Update  8/24: Patient reports feeling much better today.   at bedside he also reports that she seems closer to her baseline.  On physical examination patient is awake and alert no signs of distress or tenderness.  She has tolerated the antibiotics well.  She will discharge home on Omnicef.  She is received 2 doses of Rocephin. Awaiting PT and OT evaluation since  feels patient is still unsteady.    I have personally seen and examined the patient at bedside. I discussed the plan of care with patient, family and bedside RN.    Consultants/Specialty  none    Code Status  Full Code    Disposition  Patient is medically cleared.   Anticipate discharge to to home with organized home healthcare and close outpatient follow-up.  I have placed the appropriate orders for post-discharge needs.    Review of Systems  Review  of Systems   Constitutional: Negative for chills and fever.   Respiratory: Negative for shortness of breath.    Cardiovascular: Negative for chest pain.   Gastrointestinal: Negative for abdominal pain, diarrhea, nausea and vomiting.   Genitourinary: Negative for dysuria.   Neurological: Positive for weakness. Negative for focal weakness.   All other systems reviewed and are negative.       Physical Exam  Temp:  [36.1 °C (97 °F)-37.3 °C (99.1 °F)] 36.7 °C (98 °F)  Pulse:  [] 79  Resp:  [18-98] 18  BP: (159-180)/() 171/89  SpO2:  [91 %-96 %] 96 %    Physical Exam  Vitals and nursing note reviewed.   Constitutional:       General: She is not in acute distress.     Appearance: Normal appearance. She is obese. She is not toxic-appearing.   HENT:      Head: Normocephalic and atraumatic.   Eyes:      General: No scleral icterus.        Right eye: No discharge.         Left eye: No discharge.   Cardiovascular:      Rate and Rhythm: Normal rate and regular rhythm.      Heart sounds: No murmur heard.     Pulmonary:      Effort: Pulmonary effort is normal. No respiratory distress.      Breath sounds: Normal breath sounds. No wheezing or rales.   Abdominal:      General: There is no distension.      Tenderness: There is no abdominal tenderness. There is no guarding.   Musculoskeletal:         General: No tenderness.      Right lower leg: No edema.      Left lower leg: No edema.   Skin:     General: Skin is warm and dry.      Coloration: Skin is not jaundiced.   Neurological:      Mental Status: She is alert.      Cranial Nerves: No cranial nerve deficit.   Psychiatric:         Mood and Affect: Mood normal.         Thought Content: Thought content normal.         Judgment: Judgment normal.         Fluids    Intake/Output Summary (Last 24 hours) at 8/25/2021 0757  Last data filed at 8/25/2021 0000  Gross per 24 hour   Intake 720 ml   Output 400 ml   Net 320 ml       Laboratory  Recent Labs     08/23/21  1913  08/25/21  0054   WBC 15.6* 10.8   RBC 3.54* 3.53*   HEMOGLOBIN 10.0* 9.8*   HEMATOCRIT 29.7* 30.8*   MCV 83.9 87.3   MCH 28.2 27.8   MCHC 33.7 31.8*   RDW 45.8 48.7   PLATELETCT 226 210   MPV 10.2 10.0     Recent Labs     08/23/21  1827 08/25/21  0051   SODIUM 134* 132*   POTASSIUM 4.3 3.6   CHLORIDE 96 99   CO2 22 19*   GLUCOSE 311* 352*   BUN 22 17   CREATININE 1.28 1.17   CALCIUM 9.0 8.7     Recent Labs     08/23/21  1827   APTT 34.8   INR 1.18*               Imaging  DX-CHEST-PORTABLE (1 VIEW)   Final Result         Findings on chest radiograph appear stable since the prior radiograph.  No new abnormalities are identified. Poorly defined opacification in the left lung base is again noted.      CT-HEAD W/O   Final Result      No noncontrast CT evidence of acute intracranial hemorrhage.      Left mastoid and middle ear opacification is nonspecific and could be chronic related to effusion versus acute related to otitis media and mastoiditis. No displaced fracture is seen to indicate hemorrhage but recommend clinical correlation as this is in    the differential      Mild white matter hypodensity is present.  This is a nonspecific finding which usually is found to represent chronic microvascular disease in patient's of this demographic.  Demyelination, age indeterminant ischemia and gliosis are also common    possibilities.         DX-HIP-UNILATERAL-WITH PELVIS-1 VIEW LEFT   Final Result      No radiographic evidence of acute traumatic injury.      DX-CHEST-PORTABLE (1 VIEW)   Final Result      1.  Left basilar opacity may represent a small pleural effusion with overlying atelectasis/consolidation.   2.  Mild pulmonary vascular congestion.   3.  Left upper lobe parenchymal scarring.   4.  Atherosclerotic plaque.           Assessment/Plan  * Sepsis (HCC)  Assessment & Plan  This is Sepsis Present on admission  SIRS criteria identified on my evaluation include: Fever, with temperature greater than 101 deg F  Source is  UTI  Sepsis protocol initiated  Fluid resuscitation ordered per protocol  IV antibiotics as appropriate for source of sepsis  While organ dysfunction may be noted elsewhere in this problem list or in the chart, degree of organ dysfunction does not meet CMS criteria for severe sepsis  Improving, will dc on omnicef        Chronic anticoagulation  Assessment & Plan  States that she takes xarelto, but is unclear why  EKG shows NSR  Hold Xarelto for now. Would speak with PCP in AM for reason why she is taking xarelto     Uncontrolled diabetes mellitus with hyperglycemia (HCC)  Assessment & Plan  Insulin ED   POCT Q6H    Hyperlipidemia  Assessment & Plan  Continue pravastatin 20 mg p.o. daily    Hypertension  Assessment & Plan  Continue Norvasc 5 mg p.o. daily  Continue lisinopril 20 mg p.o. daily   continue Lopressor 25 mg p.o. twice daily      COPD (chronic obstructive pulmonary disease) ([facility])  Assessment & Plan  Lungs clear upon auscultation   Duo nebs as needed      Lactic acidosis  Assessment & Plan  Lactic acid 2.3, trend  Fluids  Antibiotics     Acute UTI  Assessment & Plan  Admit patient to medical floor  CT head negative  Rocephin   Follow blood cultures   Follow Urine cultures  Fluids   Improving on rocephin, will dc on omnicef.       VTE prophylaxis: SCDs/TEDs    I have performed a physical exam and reviewed and updated ROS and Plan today (8/25/2021). In review of yesterday's note (8/24/2021), there are no changes except as documented above.

## 2021-08-24 NOTE — DISCHARGE PLANNING
Anticipated Discharge Disposition: Home.    Action: Patient pending PT/OT evaluation. CM to monitor for home oxygen needs.    Barriers to Discharge: Medical clearance; pending PT/OT eval; monitor for home oxygen needs.    Plan: CM to continue to follow for discharge needs.

## 2021-08-25 ENCOUNTER — APPOINTMENT (OUTPATIENT)
Dept: RADIOLOGY | Facility: MEDICAL CENTER | Age: 76
DRG: 871 | End: 2021-08-25
Attending: STUDENT IN AN ORGANIZED HEALTH CARE EDUCATION/TRAINING PROGRAM
Payer: MEDICARE

## 2021-08-25 ENCOUNTER — APPOINTMENT (OUTPATIENT)
Dept: CARDIOLOGY | Facility: MEDICAL CENTER | Age: 76
DRG: 871 | End: 2021-08-25
Attending: HOSPITALIST
Payer: MEDICARE

## 2021-08-25 PROBLEM — J96.21 ACUTE ON CHRONIC RESPIRATORY FAILURE WITH HYPOXIA (HCC): Status: ACTIVE | Noted: 2021-08-25

## 2021-08-25 LAB
ALBUMIN SERPL BCP-MCNC: 3.3 G/DL (ref 3.2–4.9)
ALBUMIN/GLOB SERPL: 0.9 G/DL
ALP SERPL-CCNC: 101 U/L (ref 30–99)
ALT SERPL-CCNC: 27 U/L (ref 2–50)
ANION GAP SERPL CALC-SCNC: 14 MMOL/L (ref 7–16)
AST SERPL-CCNC: 31 U/L (ref 12–45)
BASOPHILS # BLD AUTO: 0.6 % (ref 0–1.8)
BASOPHILS # BLD: 0.06 K/UL (ref 0–0.12)
BILIRUB SERPL-MCNC: 0.4 MG/DL (ref 0.1–1.5)
BUN SERPL-MCNC: 17 MG/DL (ref 8–22)
CALCIUM SERPL-MCNC: 8.7 MG/DL (ref 8.5–10.5)
CHLORIDE SERPL-SCNC: 99 MMOL/L (ref 96–112)
CO2 SERPL-SCNC: 19 MMOL/L (ref 20–33)
CREAT SERPL-MCNC: 1.17 MG/DL (ref 0.5–1.4)
EKG IMPRESSION: NORMAL
EOSINOPHIL # BLD AUTO: 0.06 K/UL (ref 0–0.51)
EOSINOPHIL NFR BLD: 0.6 % (ref 0–6.9)
ERYTHROCYTE [DISTWIDTH] IN BLOOD BY AUTOMATED COUNT: 48.7 FL (ref 35.9–50)
EST. AVERAGE GLUCOSE BLD GHB EST-MCNC: 235 MG/DL
GLOBULIN SER CALC-MCNC: 3.6 G/DL (ref 1.9–3.5)
GLUCOSE BLD-MCNC: 319 MG/DL (ref 65–99)
GLUCOSE BLD-MCNC: 425 MG/DL (ref 65–99)
GLUCOSE BLD-MCNC: 447 MG/DL (ref 65–99)
GLUCOSE BLD-MCNC: 466 MG/DL (ref 65–99)
GLUCOSE SERPL-MCNC: 352 MG/DL (ref 65–99)
HBA1C MFR BLD: 9.8 % (ref 4–5.6)
HCT VFR BLD AUTO: 30.8 % (ref 37–47)
HGB BLD-MCNC: 9.8 G/DL (ref 12–16)
IMM GRANULOCYTES # BLD AUTO: 0.07 K/UL (ref 0–0.11)
IMM GRANULOCYTES NFR BLD AUTO: 0.6 % (ref 0–0.9)
LACTATE BLD-SCNC: 2 MMOL/L (ref 0.5–2)
LV EJECT FRACT  99904: 75
LV EJECT FRACT MOD 4C 99902: 78.3
LYMPHOCYTES # BLD AUTO: 0.54 K/UL (ref 1–4.8)
LYMPHOCYTES NFR BLD: 5 % (ref 22–41)
MCH RBC QN AUTO: 27.8 PG (ref 27–33)
MCHC RBC AUTO-ENTMCNC: 31.8 G/DL (ref 33.6–35)
MCV RBC AUTO: 87.3 FL (ref 81.4–97.8)
MONOCYTES # BLD AUTO: 0.71 K/UL (ref 0–0.85)
MONOCYTES NFR BLD AUTO: 6.6 % (ref 0–13.4)
NEUTROPHILS # BLD AUTO: 9.39 K/UL (ref 2–7.15)
NEUTROPHILS NFR BLD: 86.6 % (ref 44–72)
NRBC # BLD AUTO: 0 K/UL
NRBC BLD-RTO: 0 /100 WBC
NT-PROBNP SERPL IA-MCNC: 3233 PG/ML (ref 0–125)
PLATELET # BLD AUTO: 210 K/UL (ref 164–446)
PMV BLD AUTO: 10 FL (ref 9–12.9)
POTASSIUM SERPL-SCNC: 3.6 MMOL/L (ref 3.6–5.5)
PROT SERPL-MCNC: 6.9 G/DL (ref 6–8.2)
RBC # BLD AUTO: 3.53 M/UL (ref 4.2–5.4)
SODIUM SERPL-SCNC: 132 MMOL/L (ref 135–145)
TROPONIN T SERPL-MCNC: 30 NG/L (ref 6–19)
TROPONIN T SERPL-MCNC: 31 NG/L (ref 6–19)
WBC # BLD AUTO: 10.8 K/UL (ref 4.8–10.8)

## 2021-08-25 PROCEDURE — 700111 HCHG RX REV CODE 636 W/ 250 OVERRIDE (IP): Performed by: HOSPITALIST

## 2021-08-25 PROCEDURE — 770020 HCHG ROOM/CARE - TELE (206)

## 2021-08-25 PROCEDURE — 94664 DEMO&/EVAL PT USE INHALER: CPT

## 2021-08-25 PROCEDURE — 71045 X-RAY EXAM CHEST 1 VIEW: CPT

## 2021-08-25 PROCEDURE — 93010 ELECTROCARDIOGRAM REPORT: CPT | Performed by: INTERNAL MEDICINE

## 2021-08-25 PROCEDURE — 700117 HCHG RX CONTRAST REV CODE 255: Performed by: HOSPITALIST

## 2021-08-25 PROCEDURE — 83880 ASSAY OF NATRIURETIC PEPTIDE: CPT

## 2021-08-25 PROCEDURE — 83605 ASSAY OF LACTIC ACID: CPT

## 2021-08-25 PROCEDURE — 85025 COMPLETE CBC W/AUTO DIFF WBC: CPT

## 2021-08-25 PROCEDURE — 700101 HCHG RX REV CODE 250: Performed by: STUDENT IN AN ORGANIZED HEALTH CARE EDUCATION/TRAINING PROGRAM

## 2021-08-25 PROCEDURE — 97161 PT EVAL LOW COMPLEX 20 MIN: CPT

## 2021-08-25 PROCEDURE — 93306 TTE W/DOPPLER COMPLETE: CPT | Mod: 26 | Performed by: INTERNAL MEDICINE

## 2021-08-25 PROCEDURE — 700102 HCHG RX REV CODE 250 W/ 637 OVERRIDE(OP): Performed by: HOSPITALIST

## 2021-08-25 PROCEDURE — 80053 COMPREHEN METABOLIC PANEL: CPT

## 2021-08-25 PROCEDURE — 700102 HCHG RX REV CODE 250 W/ 637 OVERRIDE(OP): Performed by: STUDENT IN AN ORGANIZED HEALTH CARE EDUCATION/TRAINING PROGRAM

## 2021-08-25 PROCEDURE — 93306 TTE W/DOPPLER COMPLETE: CPT

## 2021-08-25 PROCEDURE — 94640 AIRWAY INHALATION TREATMENT: CPT

## 2021-08-25 PROCEDURE — A9270 NON-COVERED ITEM OR SERVICE: HCPCS | Performed by: HOSPITALIST

## 2021-08-25 PROCEDURE — A9270 NON-COVERED ITEM OR SERVICE: HCPCS | Performed by: STUDENT IN AN ORGANIZED HEALTH CARE EDUCATION/TRAINING PROGRAM

## 2021-08-25 PROCEDURE — 700105 HCHG RX REV CODE 258: Performed by: STUDENT IN AN ORGANIZED HEALTH CARE EDUCATION/TRAINING PROGRAM

## 2021-08-25 PROCEDURE — 99232 SBSQ HOSP IP/OBS MODERATE 35: CPT | Performed by: HOSPITALIST

## 2021-08-25 PROCEDURE — 93005 ELECTROCARDIOGRAM TRACING: CPT | Performed by: STUDENT IN AN ORGANIZED HEALTH CARE EDUCATION/TRAINING PROGRAM

## 2021-08-25 PROCEDURE — 36415 COLL VENOUS BLD VENIPUNCTURE: CPT

## 2021-08-25 PROCEDURE — 700111 HCHG RX REV CODE 636 W/ 250 OVERRIDE (IP): Performed by: STUDENT IN AN ORGANIZED HEALTH CARE EDUCATION/TRAINING PROGRAM

## 2021-08-25 PROCEDURE — 82962 GLUCOSE BLOOD TEST: CPT | Mod: 91

## 2021-08-25 PROCEDURE — 97165 OT EVAL LOW COMPLEX 30 MIN: CPT

## 2021-08-25 PROCEDURE — 84484 ASSAY OF TROPONIN QUANT: CPT | Mod: 91

## 2021-08-25 PROCEDURE — 83036 HEMOGLOBIN GLYCOSYLATED A1C: CPT

## 2021-08-25 RX ORDER — FUROSEMIDE 10 MG/ML
20 INJECTION INTRAMUSCULAR; INTRAVENOUS
Status: DISCONTINUED | OUTPATIENT
Start: 2021-08-25 | End: 2021-08-26 | Stop reason: HOSPADM

## 2021-08-25 RX ORDER — FLUTICASONE PROPIONATE 44 UG/1
2 AEROSOL, METERED RESPIRATORY (INHALATION) DAILY
Status: DISCONTINUED | OUTPATIENT
Start: 2021-08-25 | End: 2021-08-26 | Stop reason: HOSPADM

## 2021-08-25 RX ORDER — DEXTROSE MONOHYDRATE 25 G/50ML
50 INJECTION, SOLUTION INTRAVENOUS
Status: DISCONTINUED | OUTPATIENT
Start: 2021-08-25 | End: 2021-08-26 | Stop reason: HOSPADM

## 2021-08-25 RX ORDER — LISINOPRIL 20 MG/1
40 TABLET ORAL 2 TIMES DAILY
Status: DISCONTINUED | OUTPATIENT
Start: 2021-08-25 | End: 2021-08-26 | Stop reason: HOSPADM

## 2021-08-25 RX ORDER — HEPARIN SODIUM 5000 [USP'U]/ML
5000 INJECTION, SOLUTION INTRAVENOUS; SUBCUTANEOUS EVERY 8 HOURS
Status: DISCONTINUED | OUTPATIENT
Start: 2021-08-25 | End: 2021-08-26

## 2021-08-25 RX ADMIN — HEPARIN SODIUM 5000 UNITS: 5000 INJECTION, SOLUTION INTRAVENOUS; SUBCUTANEOUS at 20:12

## 2021-08-25 RX ADMIN — ALPRAZOLAM 1 MG: 1 TABLET ORAL at 08:22

## 2021-08-25 RX ADMIN — METOPROLOL TARTRATE 25 MG: 25 TABLET, FILM COATED ORAL at 17:42

## 2021-08-25 RX ADMIN — AMLODIPINE BESYLATE 5 MG: 5 TABLET ORAL at 05:48

## 2021-08-25 RX ADMIN — INSULIN GLARGINE 10 UNITS: 100 INJECTION, SOLUTION SUBCUTANEOUS at 17:45

## 2021-08-25 RX ADMIN — FUROSEMIDE 20 MG: 10 INJECTION, SOLUTION INTRAMUSCULAR; INTRAVENOUS at 17:43

## 2021-08-25 RX ADMIN — LABETALOL HYDROCHLORIDE 10 MG: 5 INJECTION INTRAVENOUS at 08:22

## 2021-08-25 RX ADMIN — LISINOPRIL 20 MG: 20 TABLET ORAL at 05:48

## 2021-08-25 RX ADMIN — PRAVASTATIN SODIUM 20 MG: 20 TABLET ORAL at 05:47

## 2021-08-25 RX ADMIN — HUMAN ALBUMIN MICROSPHERES AND PERFLUTREN 3 ML: 10; .22 INJECTION, SOLUTION INTRAVENOUS at 13:30

## 2021-08-25 RX ADMIN — CEFTRIAXONE SODIUM 2 G: 2 INJECTION, POWDER, FOR SOLUTION INTRAMUSCULAR; INTRAVENOUS at 05:49

## 2021-08-25 RX ADMIN — INSULIN HUMAN 15 UNITS: 100 INJECTION, SOLUTION PARENTERAL at 13:40

## 2021-08-25 RX ADMIN — ALBUTEROL SULFATE 2.5 MG: 2.5 SOLUTION RESPIRATORY (INHALATION) at 00:41

## 2021-08-25 RX ADMIN — FUROSEMIDE 20 MG: 10 INJECTION, SOLUTION INTRAMUSCULAR; INTRAVENOUS at 11:31

## 2021-08-25 RX ADMIN — FLUTICASONE PROPIONATE 88 MCG: 44 AEROSOL, METERED RESPIRATORY (INHALATION) at 20:11

## 2021-08-25 RX ADMIN — LISINOPRIL 40 MG: 20 TABLET ORAL at 17:42

## 2021-08-25 RX ADMIN — UMECLIDINIUM BROMIDE AND VILANTEROL TRIFENATATE 1 PUFF: 62.5; 25 POWDER RESPIRATORY (INHALATION) at 20:11

## 2021-08-25 RX ADMIN — FUROSEMIDE 20 MG: 20 TABLET ORAL at 05:48

## 2021-08-25 RX ADMIN — INSULIN HUMAN 12 UNITS: 100 INJECTION, SOLUTION PARENTERAL at 05:55

## 2021-08-25 RX ADMIN — ALPRAZOLAM 1 MG: 1 TABLET ORAL at 00:15

## 2021-08-25 RX ADMIN — HEPARIN SODIUM 5000 UNITS: 5000 INJECTION, SOLUTION INTRAVENOUS; SUBCUTANEOUS at 17:43

## 2021-08-25 RX ADMIN — METOPROLOL TARTRATE 25 MG: 25 TABLET, FILM COATED ORAL at 05:48

## 2021-08-25 RX ADMIN — GABAPENTIN 100 MG: 100 CAPSULE ORAL at 05:47

## 2021-08-25 RX ADMIN — PAROXETINE 60 MG: 30 TABLET, FILM COATED ORAL at 05:47

## 2021-08-25 RX ADMIN — TRAZODONE HYDROCHLORIDE 150 MG: 50 TABLET ORAL at 20:12

## 2021-08-25 RX ADMIN — SODIUM CHLORIDE, POTASSIUM CHLORIDE, SODIUM LACTATE AND CALCIUM CHLORIDE: 600; 310; 30; 20 INJECTION, SOLUTION INTRAVENOUS at 02:51

## 2021-08-25 ASSESSMENT — COGNITIVE AND FUNCTIONAL STATUS - GENERAL
CLIMB 3 TO 5 STEPS WITH RAILING: A LITTLE
MOBILITY SCORE: 19
TOILETING: A LITTLE
WALKING IN HOSPITAL ROOM: A LITTLE
MOVING FROM LYING ON BACK TO SITTING ON SIDE OF FLAT BED: A LITTLE
DRESSING REGULAR LOWER BODY CLOTHING: A LITTLE
MOVING TO AND FROM BED TO CHAIR: A LITTLE
TURNING FROM BACK TO SIDE WHILE IN FLAT BAD: A LITTLE
SUGGESTED CMS G CODE MODIFIER MOBILITY: CK
HELP NEEDED FOR BATHING: A LITTLE
DAILY ACTIVITIY SCORE: 21
SUGGESTED CMS G CODE MODIFIER DAILY ACTIVITY: CJ

## 2021-08-25 ASSESSMENT — ENCOUNTER SYMPTOMS
DOUBLE VISION: 0
SHORTNESS OF BREATH: 1
WHEEZING: 0
COUGH: 0
DIZZINESS: 0
PALPITATIONS: 0
DEPRESSION: 0
HEARTBURN: 0
BLURRED VISION: 0
CHILLS: 0
WEAKNESS: 1
BACK PAIN: 0
CLAUDICATION: 0
FEVER: 0
BRUISES/BLEEDS EASILY: 0
PND: 0
NAUSEA: 0
FOCAL WEAKNESS: 0
SHORTNESS OF BREATH: 0
ABDOMINAL PAIN: 0
VOMITING: 0
DIARRHEA: 0
HEMOPTYSIS: 0
MYALGIAS: 0
HEADACHES: 0

## 2021-08-25 ASSESSMENT — ACTIVITIES OF DAILY LIVING (ADL): TOILETING: INDEPENDENT

## 2021-08-25 ASSESSMENT — GAIT ASSESSMENTS
DEVIATION: OTHER (COMMENT)
GAIT LEVEL OF ASSIST: SUPERVISED
ASSISTIVE DEVICE: FRONT WHEEL WALKER
DISTANCE (FEET): 40

## 2021-08-25 ASSESSMENT — PAIN DESCRIPTION - PAIN TYPE
TYPE: ACUTE PAIN

## 2021-08-25 ASSESSMENT — FIBROSIS 4 INDEX: FIB4 SCORE: 2.13

## 2021-08-25 NOTE — CARE PLAN
The patient is Watcher - Medium risk of patient condition declining or worsening    Shift Goals  Clinical Goals: Rest, comfort, reorientation  Patient Goals: rest, comfort    Progress made toward(s) clinical / shift goals:  Pt up to chair       Problem: Knowledge Deficit - Standard  Goal: Patient and family/care givers will demonstrate understanding of plan of care, disease process/condition, diagnostic tests and medications  Outcome: Not Progressing

## 2021-08-25 NOTE — HOSPITAL COURSE
Josefina Carmona is a 76 y.o. female who presented 8/23/2021 with altered mental status.  Patient woke up this afternoon, she states that it is difficult for her to ambulate to the bathroom.  She felt her legs were very weak and he felt like it took an hour to get to the bottom.  She states that she did not remember everything that happened prior to admission.  Denies fall.  Denies dysuria.      Patient states that she has a history of hypertension and COPD she used to smoke heavily however states that she quit smoking several years ago.       In ED, Found to be febrile and tachycardic, other vital signs within normal limits..  Initially per ED she was altered and unable provide a clear history however after diuretics and fluids were administered, her mental status improved.  Remarkable labs include neutrophilic leukocytosis, sugar 311, lactic acid 2.3.  UA suggestive of UTI.

## 2021-08-25 NOTE — PROGRESS NOTES
4 Eyes Skin Assessment Completed by CONNER Miguel and CONNER Gibson.    Head WDL  Ears WDL  Nose WDL  Mouth WDL  Neck WDL  Breast/Chest Redness and Blanching  Shoulder Blades WDL  Spine Redness and Blanching  (R) Arm/Elbow/Hand WDL  (L) Arm/Elbow/Hand WDL  Abdomen WDL  Groin Redness and Blanching  Scrotum/Coccyx/Buttocks Redness, slow to jennifer  (R) Leg Scab and Bruising  (L) Leg Scab and Bruising  (R) Heel/Foot/Toe WDL  (L) Heel/Foot/Toe WDL          Devices In Places Tele Box and Nasal Cannula      Interventions In Place Gray Ear Foams, Waffle Overlay, Pillows, Q2 Turns and Barrier Cream    Possible Skin Injury Yes    Pictures Uploaded Into Epic Yes  Wound Consult Placed Yes  RN Wound Prevention Protocol Ordered Yes

## 2021-08-25 NOTE — RESPIRATORY CARE
Called for PRN treatment, arrived and patient was already escalated to Rapid for chest pain.   Gave PRN for COPD history. Patient states mild relief. [facility] team will continue to evaluate and monitor.

## 2021-08-25 NOTE — THERAPY
Physical Therapy   Initial Evaluation     Patient Name: Josefina Carmona  Age:  76 y.o., Sex:  female  Medical Record #: [id number]  Today's Date: 8/25/2021     Precautions: Fall Risk, Other (See Comments) (increased supplemental 02 needs)    Assessment  After initial evaluation and pt education, pt has no further acute PT needs. SHe appears to be close to her functional baseline per pt report and current objective findings. She was able to exit bed, perform sit<>stands from varied surfaces and ambulatory activity with FWW with Spv. She is primarily limited 2' to SOB/ULLOA with limited activity though per pt this appears to be close to baseline. She reports no functional concerns to return to home once medically cleared as well as reports has appropriate ADs and spouse able to assist as needed. See below for details/recs.     Plan    Recommend Physical Therapy for Evaluation only     DC Equipment Recommendations: None  Discharge Recommendations: Anticipate that the patient will have no further physical therapy needs after discharge from the hospital (possible outpatient pulmonary rehab)        08/25/21 3679   Prior Living Situation   Prior Services Intermittent Physical Support for ADL Per Family  (and IADls)   Housing / Facility 1 Story House   Steps Into Home 0   Steps In Home 0   Bathroom Set up Bathtub / Shower Combination;Shower Chair;Grab Bars   Equipment Owned 4-Wheel Walker;Single Point Cane;Wheelchair;Tub / Shower Seat;Grab Bar(s) In Tub / Shower   Lives with - Patient's Self Care Capacity Spouse   Comments reports spouse assists as needed with IADLs and ADls; reports needs assist to stand from couch at times; appears spouse is able to assist as needed   Prior Level of Functional Mobility   Bed Mobility Independent   Transfer Status Independent   Ambulation Independent   Distance Ambulation (Feet)   (appears mostly household; lmited community on occasion?)   Assistive Devices Used 4-Wheel Walker   Stairs  Unable To Determine At This Time   Comments see above   History of Falls   History of Falls No   Cognition    Cognition / Consciousness X   Level of Consciousness Alert   Comments very pleasant and cooperative; may have some higher level cognitive deficits/problem solving (hx of hypoxia?)   Passive ROM Lower Body   Passive ROM Lower Body WDL   Active ROM Lower Body    Active ROM Lower Body  WDL   Strength Lower Body   Lower Body Strength  WDL   Sensation Lower Body   Lower Extremity Sensation   WDL   Balance Assessment   Sitting Balance (Static) Fair +   Sitting Balance (Dynamic) Fair +   Standing Balance (Static) Fair   Standing Balance (Dynamic) Fair   Weight Shift Sitting Good   Weight Shift Standing Good   Comments no akanksha LOB during ambulation with FWW; also noted able to transfer without AD (via furniture walking)   Gait Analysis   Gait Level Of Assist Supervised   Assistive Device Front Wheel Walker   Distance (Feet) 40   # of Times Distance was Traveled 1   Deviation Other (Comment)  (reciprocal gait; dec julianne/clearance)   # of Stairs Climbed 0   Weight Bearing Status no restrictions   Comments see balance; mostly spv 2' to cog concerns   Bed Mobility    Supine to Sit Modified Independent   Sit to Supine   (left in chair)   Scooting Supervised   Comments HOB elevated   Functional Mobility   Sit to Stand Supervised   Bed, Chair, Wheelchair Transfer Supervised   Transfer Method Stand Step   Mobility with FWW; noted able ot stand from bedside chair without physical assist   How much difficulty does the patient currently have...   Turning over in bed (including adjusting bedclothes, sheets and blankets)? 3   Sitting down on and standing up from a chair with arms (e.g., wheelchair, bedside commode, etc.) 3   Moving from lying on back to sitting on the side of the bed? 3   How much help from another person does the patient currently need...   Moving to and from a bed to a chair (including a wheelchair)? 4    Need to walk in a hospital room? 3   Climbing 3-5 steps with a railing? 3   6 clicks Mobility Score 19   Activity Tolerance   Sitting in Chair at least 10 mins   Sitting Edge of Bed at least 3 mins   Standing ~3-4 mins   Comments increased SOB/WOB with activity; however this appears may be baseline given pt subjective hx (reports becomes SOB with limited activity at home)   Edema / Skin Assessment   Edema / Skin  Not Assessed   Education Group   Education Provided Role of Physical Therapist   Role of Physical Therapist Patient Response Patient;Acceptance;Explanation;Verbal Demonstration

## 2021-08-25 NOTE — FACE TO FACE
Face to Face Supporting Documentation - Home Health    The encounter with this patient was in whole or in part the primary reason for home health admission.    Date of encounter:   Patient:                    MRN:                       YOB: 2020  Josefina Carmona  [id number]  1945     Home health to see patient for:  Skilled Nursing care for assessment, interventions & education    Skilled need for:  Comment: PT/OT    Skilled nursing interventions to include:  Comment: PT/OT    Homebound status evidenced by:  Needs the assistance of another person in order to leave the home. Leaving home requires a considerable and taxing effort. There is a normal inability to leave the home.    Community Physician to provide follow up care: No primary care provider on file.     Optional Interventions? No      I certify the face to face encounter for this home health care referral meets the CMS requirements and the encounter/clinical assessment with the patient was, in whole, or in part, for the medical condition(s) listed above, which is the primary reason for home health care. Based on my clinical findings: the service(s) are medically necessary, support the need for home health care, and the homebound criteria are met.  I certify that this patient has had a face to face encounter by myself.  Nolan Sanchez M.D. - NPI: [id number]

## 2021-08-25 NOTE — PROGRESS NOTES
Transfer to telemetry order received. Tele RN at bedside and hand off report provided. Escorted pt to T818 with Tele RN.

## 2021-08-25 NOTE — FACE TO FACE
Face to Face Supporting Documentation - Home Health    The encounter with this patient was in whole or in part the primary reason for home health admission.    Date of encounter:   Patient:                    MRN:                       YOB: 2020  Josefina Carmona  [id number]  1945     Home health to see patient for:  Skilled Nursing care for assessment, interventions & education    Skilled need for:  Comment: pt/ot    Skilled nursing interventions to include:  Comment: pt/ot    Homebound status evidenced by:  Needs the assistance of another person in order to leave the home. Leaving home requires a considerable and taxing effort. There is a normal inability to leave the home.    Community Physician to provide follow up care: No primary care provider on file.     Optional Interventions? No      I certify the face to face encounter for this home health care referral meets the CMS requirements and the encounter/clinical assessment with the patient was, in whole, or in part, for the medical condition(s) listed above, which is the primary reason for home health care. Based on my clinical findings: the service(s) are medically necessary, support the need for home health care, and the homebound criteria are met.  I certify that this patient has had a face to face encounter by myself.  Nolan Sanchez M.D. - NPI: [id number]

## 2021-08-25 NOTE — WOUND TEAM
[facility]  Inpatient Services  Wound and Skin Care Brief Evaluation    Admission Date: 8/23/2021     Last order of IP CONSULT TO WOUND CARE was found on 8/24/2021 from Hospital Encounter on 8/23/2021     HPI, PMH, SH: Reviewed    Chief Complaint   Patient presents with   • Fall     fell off chair, per ems  witnessed and did not hit head   • Fatigue     exposed to son who tested + covid   • Altered Mental Status   • Fever     Diagnosis: Sepsis secondary to UTI (HCC) [A41.9, N39.0]    Unit where seen by Wound Team: T818/00     Wound consult placed regarding sacral/coccyx area. Chart and images reviewed. This discussed with bedside RN. This RN in to assess patient. Pt pleasant and agreeable. No pressure injuries or advanced wound care needs identified. Wound consult completed. No further follow up unless indicated and consulted.             RSKIN:   CURRENTLY IN PLACE (X), APPLIED THIS VISIT (A), ORDERED (O):   Q shift Ismael:  X  Q shift pressure point assessments:  X    Surface/Positioning   Pressure redistribution mattress     X       Low Airloss          Bariatric foam      Bariatric ASHLEY     Waffle cushion        Waffle Overlay   X       Reposition q 2 hours    Self mobile  TAPs Turning system     Z Jhony Pillow     Offloading/Redistribution   Sacral Mepilex (Silicone dressing)   X  Heel Mepilex (Silicone dressing)         Heel float boots (Prevalon boot)             Float Heels off Bed with Pillows           Respiratory   Silicone O2 tubing         Gray Foam Ear protectors     Cannula fixation Device (Tender )          High flow offloading Clip    Elastic head band offloading device      Anchorfast                                                         Trach with Optifoam split foam             Containment/Moisture Prevention Continent    Rectal tube or BMS    Purwick/Condom Cath        Figueroa Catheter    Barrier wipes           Barrier paste       Antifungal tx      Interdry         Mobilization       Up to chair   X     Ambulate    X  PT/OT      Nutrition       Dietician        Diabetes Education      PO  X   TF     TPN     NPO   # days     Other

## 2021-08-25 NOTE — PROGRESS NOTES
Vitals checked after hydralazine, blood pressure still 171/74 after hydralazine administration.  Patient complaining of chest, neck and muscle pain, as well as anxiety. Escalated and called a rapid response. On call rapid response hospitalist notified.

## 2021-08-25 NOTE — CARE PLAN
The patient is Watcher - Medium risk of patient condition declining or worsening    Shift Goals  Clinical Goals: Rest, comfort, reorientation  Patient Goals: Comfort    Progress made toward(s) clinical / shift goals:  Discussed with patient the plan for skin breakdown prevention, continued antibiotics and collaboration with physical and occupational therapy in the morning. Patient indicated understanding of the plan of care and no further needs at the time. Reinforced use of call light, bed locked in lowest position, belongings within reach, bed alarm on, non-slip socks in place.     Problem: Knowledge Deficit - Standard  Goal: Patient and family/care givers will demonstrate understanding of plan of care, disease process/condition, diagnostic tests and medications  Outcome: Progressing     Problem: Skin Integrity  Goal: Skin integrity is maintained or improved  Outcome: Progressing     Problem: Fall Risk  Goal: Patient will remain free from falls  Outcome: Progressing

## 2021-08-25 NOTE — THERAPY
"Occupational Therapy   Initial Evaluation     Patient Name: Josefina Carmona  Age:  76 y.o., Sex:  female  Medical Record #: [id number]  Today's Date: 8/25/2021     Precautions  Precautions: (P) Fall Risk    Assessment  Patient is 76 y.o. female admitted for AMS, sepsis, HLD, COPD. Pt normally independent with most basic ADLs and mobility with a 4WW living in a ground floor apartment with spouse who intermittently assists patient as needed. Pt able to complete all mobility and ADLs with supervision and FWW, limited by WOB and slight SOB but otherwise physically able to complete all mobility and ADLs with no assistance. Pt seems to be at her functional baseline at this time. Patient will not be actively followed for occupational therapy services at this time, however may be seen if requested by physician for 1 more visit within 30 days to address any discharge or equipment needs.     Plan    Recommend Occupational Therapy for Evaluation only.    DC Equipment Recommendations: (P) None  Discharge Recommendations: (P) Anticipate that the patient will have no further occupational therapy needs after discharge from the hospital     Subjective    \"Why do I have to sit in that chair and not just this one\"     Objective       08/25/21 1510   Prior Living Situation   Prior Services Intermittent Physical Support for ADL Per Family   Housing / Facility 1 Story Apartment / Condo   Steps Into Home 0   Steps In Home 0   Bathroom Set up Bathtub / Shower Combination;Shower Chair;Grab Bars   Equipment Owned 4-Wheel Walker;Tub / Shower Seat;Grab Bar(s) In Tub / Shower;Wheelchair   Lives with - Patient's Self Care Capacity Spouse   Comments Spouse able and has been assisting with ADLs/transfers as needed   Prior Level of ADL Function   Self Feeding Independent   Grooming / Hygiene Independent   Bathing Independent   Dressing Independent   Toileting Independent   Prior Level of IADL Function   Medication Management Independent "   Laundry Independent   Kitchen Mobility Independent   Finances Independent   Home Management Independent   Shopping Independent   Prior Level Of Mobility Independent With Device in Home   Driving / Transportation Relatives / Others Provide Transportation   Occupation (Pre-Hospital Vocational) Not Employed   History of Falls   History of Falls No   Precautions   Precautions Fall Risk   Pain 0 - 10 Group   Therapist Pain Assessment Post Activity Pain Same as Prior to Activity;Nurse Notified   Cognition    Cognition / Consciousness X   Level of Consciousness Alert   Comments Pleasant, cooperative, willing to participate   Active ROM Upper Body   Active ROM Upper Body  WDL   Dominant Hand Right   Strength Upper Body   Upper Body Strength  WDL   Sensation Upper Body   Upper Extremity Sensation  WDL   Upper Body Muscle Tone   Upper Body Muscle Tone  WDL   Neurological Concerns   Neurological Concerns No   Coordination Upper Body   Coordination WDL   Balance Assessment   Sitting Balance (Static) Fair +   Sitting Balance (Dynamic) Fair +   Standing Balance (Static) Fair   Standing Balance (Dynamic) Fair   Weight Shift Sitting Good   Weight Shift Standing Good   Comments w/ FWW   Bed Mobility    Supine to Sit Supervised   Scooting Supervised   Rolling Supervised   ADL Assessment   Grooming Supervision   Upper Body Dressing Supervision   Lower Body Dressing Supervision   Toileting   (NT-refused need)   How much help from another person does the patient currently need...   Putting on and taking off regular lower body clothing? 3   Bathing (including washing, rinsing, and drying)? 3   Toileting, which includes using a toilet, bedpan, or urinal? 3   Putting on and taking off regular upper body clothing? 4   Taking care of personal grooming such as brushing teeth? 4   Eating meals? 4   6 Clicks Daily Activity Score 21   Functional Mobility   Sit to Stand Supervised   Bed, Chair, Wheelchair Transfer Supervised   Toilet Transfers  Refused   Transfer Method Stand Step   Mobility bed mobility, in room mobility, up to chair   Comments w/ FWW   Visual Perception   Visual Perception  Not Tested   Edema / Skin Assessment   Edema / Skin  Not Assessed   Activity Tolerance   Sitting in Chair left seatd in chair   Sitting Edge of Bed 5   Standing 10   Education Group   Education Provided Role of Occupational Therapist   Role of Occupational Therapist Patient Response Patient;Acceptance;Explanation   Problem List   Problem List None   Interdisciplinary Plan of Care Collaboration   IDT Collaboration with  Nursing   Patient Position at End of Therapy Seated;Chair Alarm On;Call Light within Reach;Tray Table within Reach;Phone within Reach   Collaboration Comments RN updated

## 2021-08-25 NOTE — CODE DOCUMENTATION
Rapid called for chest pain. Patient states that pain is only when she takes a deep breath. RT at bedside to administer breathing treatment. EKG, CXR and troponin ordered per protocol. EKG showing a fib, rate .

## 2021-08-25 NOTE — PROGRESS NOTES
Paged Dr. Pelayo regarding persistent hypertension for orders. Dr. Pelayo ordered one time dose of hydralazine.

## 2021-08-25 NOTE — PROGRESS NOTES
[facility] Medicine Daily Progress Note    Date of Service  8/25/2021    Chief Complaint  Josefina Carmona is a 76 y.o. female admitted 8/23/2021 with fall    Hospital Course  Josefina Carmona is a 76 y.o. female who presented 8/23/2021 with altered mental status.  Patient woke up this afternoon, she states that it is difficult for her to ambulate to the bathroom.  She felt her legs were very weak and he felt like it took an hour to get to the bottom.  She states that she did not remember everything that happened prior to admission.  Denies fall.  Denies dysuria.      Patient states that she has a history of hypertension and COPD she used to smoke heavily however states that she quit smoking several years ago.       In ED, Found to be febrile and tachycardic, other vital signs within normal limits..  Initially per ED she was altered and unable provide a clear history however after diuretics and fluids were administered, her mental status improved.  Remarkable labs include neutrophilic leukocytosis, sugar 311, lactic acid 2.3.  UA suggestive of UTI.      Interval Problem Update  8/25 in bed, feels ok, no chest pain no sob no palpitation, not in distress, on 4Lof o2, family at bedside, ptot today will get echo, troponin mildly elevated but stable.         Consultants/Specialty  none    Code Status  Full Code    Disposition  Patient is not medically cleared.   Anticipate discharge to to home with organized home healthcare and close outpatient follow-up.  I have placed the appropriate orders for post-discharge needs.    Review of Systems  Review of Systems   Constitutional: Negative for chills and fever.   HENT: Negative for congestion and nosebleeds.    Eyes: Negative for blurred vision and double vision.   Respiratory: Positive for shortness of breath. Negative for cough, hemoptysis and wheezing.    Cardiovascular: Negative for chest pain, palpitations, claudication, leg swelling and PND.   Gastrointestinal:  Negative for heartburn, nausea and vomiting.   Genitourinary: Negative for hematuria and urgency.   Musculoskeletal: Negative for back pain and myalgias.   Skin: Negative for rash.   Neurological: Negative for dizziness and headaches.   Endo/Heme/Allergies: Does not bruise/bleed easily.   Psychiatric/Behavioral: Negative for depression.        Physical Exam  Temp:  [36.1 °C (97 °F)-37.3 °C (99.1 °F)] 36.7 °C (98 °F)  Pulse:  [] 79  Resp:  [18-98] 18  BP: (158-177)/() 158/64  SpO2:  [93 %-97 %] 97 %    Physical Exam  Vitals and nursing note reviewed.   Constitutional:       Appearance: Normal appearance.   HENT:      Head: Normocephalic.      Mouth/Throat:      Mouth: Mucous membranes are dry.   Eyes:      General: No scleral icterus.        Right eye: No discharge.         Left eye: No discharge.   Cardiovascular:      Rate and Rhythm: Normal rate and regular rhythm.      Pulses: Normal pulses.      Heart sounds: Normal heart sounds.   Pulmonary:      Effort: Pulmonary effort is normal. No respiratory distress.      Breath sounds: Decreased breath sounds present. No wheezing.   Abdominal:      General: Bowel sounds are normal. There is no distension.      Palpations: Abdomen is soft.      Tenderness: There is no abdominal tenderness. There is no guarding.   Musculoskeletal:         General: Normal range of motion.      Cervical back: Normal range of motion and neck supple. No rigidity.      Right lower leg: Edema present.      Left lower leg: Edema present.   Skin:     General: Skin is warm and dry.      Capillary Refill: Capillary refill takes less than 2 seconds.      Coloration: Skin is not jaundiced.   Neurological:      General: No focal deficit present.      Mental Status: She is alert and oriented to person, place, and time.      Cranial Nerves: No cranial nerve deficit.   Psychiatric:         Mood and Affect: Mood normal.         Fluids    Intake/Output Summary (Last 24 hours) at 8/25/2021  1546  Last data filed at 8/25/2021 0000  Gross per 24 hour   Intake 240 ml   Output 400 ml   Net -160 ml       Laboratory  Recent Labs     08/23/21 1827 08/25/21  0054   WBC 15.6* 10.8   RBC 3.54* 3.53*   HEMOGLOBIN 10.0* 9.8*   HEMATOCRIT 29.7* 30.8*   MCV 83.9 87.3   MCH 28.2 27.8   MCHC 33.7 31.8*   RDW 45.8 48.7   PLATELETCT 226 210   MPV 10.2 10.0     Recent Labs     08/23/21 1827 08/25/21  0051   SODIUM 134* 132*   POTASSIUM 4.3 3.6   CHLORIDE 96 99   CO2 22 19*   GLUCOSE 311* 352*   BUN 22 17   CREATININE 1.28 1.17   CALCIUM 9.0 8.7     Recent Labs     08/23/21 1827   APTT 34.8   INR 1.18*               Imaging  DX-CHEST-PORTABLE (1 VIEW)   Final Result         Findings on chest radiograph appear stable since the prior radiograph.  No new abnormalities are identified. Poorly defined opacification in the left lung base is again noted.      CT-HEAD W/O   Final Result      No noncontrast CT evidence of acute intracranial hemorrhage.      Left mastoid and middle ear opacification is nonspecific and could be chronic related to effusion versus acute related to otitis media and mastoiditis. No displaced fracture is seen to indicate hemorrhage but recommend clinical correlation as this is in    the differential      Mild white matter hypodensity is present.  This is a nonspecific finding which usually is found to represent chronic microvascular disease in patient's of this demographic.  Demyelination, age indeterminant ischemia and gliosis are also common    possibilities.         DX-HIP-UNILATERAL-WITH PELVIS-1 VIEW LEFT   Final Result      No radiographic evidence of acute traumatic injury.      DX-CHEST-PORTABLE (1 VIEW)   Final Result      1.  Left basilar opacity may represent a small pleural effusion with overlying atelectasis/consolidation.   2.  Mild pulmonary vascular congestion.   3.  Left upper lobe parenchymal scarring.   4.  Atherosclerotic plaque.      EC-ECHOCARDIOGRAM COMPLETE W/ CONT    (Results  Pending)        Assessment/Plan  * Sepsis (HCC)  Assessment & Plan  This is Sepsis Present on admission  SIRS criteria identified on my evaluation include: Fever, with temperature greater than 101 deg F  Source is UTI  Sepsis protocol initiated  Fluid resuscitation ordered per protocol  IV antibiotics as appropriate for source of sepsis  While organ dysfunction may be noted elsewhere in this problem list or in the chart, degree of organ dysfunction does not meet CMS criteria for severe sepsis  resolved        Acute on chronic respiratory failure with hypoxia ([facility])  Assessment & Plan  Stopped iv, started on iv lasix  Patient is on 4L at home  cxr showed edema   Echo pending.     Chronic anticoagulation  Assessment & Plan  Continue monitoring.     Uncontrolled diabetes mellitus with hyperglycemia ([facility])  Assessment & Plan  Insulin ED   POCT Q6H    Hyperlipidemia  Assessment & Plan  Continue pravastatin 20 mg p.o. daily    Hypertension  Assessment & Plan  Continue Norvasc 5 mg p.o. daily  Continue lisinopril 20 mg p.o. daily   continue Lopressor 25 mg p.o. twice daily      COPD (chronic obstructive pulmonary disease) ([facility])  Assessment & Plan  Lungs clear upon auscultation   Duo nebs as needed      Lactic acidosis  Assessment & Plan  improved    Acute UTI  Assessment & Plan  Admit patient to medical floor  CT head negative  Rocephin   improving         VTE prophylaxis: heparin ppx    I have performed a physical exam and reviewed and updated ROS and Plan today (8/25/2021). In review of yesterday's note (8/24/2021), there are no changes except as documented above.    Case and plan of care discussed with nurse staff and during multidisciplinary rounds.

## 2021-08-26 ENCOUNTER — PATIENT OUTREACH (OUTPATIENT)
Dept: HEALTH INFORMATION MANAGEMENT | Facility: OTHER | Age: 76
End: 2021-08-26

## 2021-08-26 ENCOUNTER — PHARMACY VISIT (OUTPATIENT)
Dept: PHARMACY | Facility: MEDICAL CENTER | Age: 76
End: 2021-08-26
Payer: COMMERCIAL

## 2021-08-26 VITALS
HEART RATE: 68 BPM | BODY MASS INDEX: 28.72 KG/M2 | SYSTOLIC BLOOD PRESSURE: 124 MMHG | TEMPERATURE: 98 F | HEIGHT: 70 IN | WEIGHT: 200.62 LBS | DIASTOLIC BLOOD PRESSURE: 79 MMHG | OXYGEN SATURATION: 95 % | RESPIRATION RATE: 16 BRPM

## 2021-08-26 PROBLEM — N39.0 ACUTE UTI: Status: RESOLVED | Noted: 2021-08-23 | Resolved: 2021-08-26

## 2021-08-26 PROBLEM — E87.20 LACTIC ACIDOSIS: Status: RESOLVED | Noted: 2021-08-23 | Resolved: 2021-08-26

## 2021-08-26 PROBLEM — J96.21 ACUTE ON CHRONIC RESPIRATORY FAILURE WITH HYPOXIA (HCC): Status: RESOLVED | Noted: 2021-08-25 | Resolved: 2021-08-26

## 2021-08-26 PROBLEM — A41.9 SEPSIS (HCC): Status: RESOLVED | Noted: 2021-08-23 | Resolved: 2021-08-26

## 2021-08-26 LAB
ANION GAP SERPL CALC-SCNC: 11 MMOL/L (ref 7–16)
BASOPHILS # BLD AUTO: 0.6 % (ref 0–1.8)
BASOPHILS # BLD: 0.04 K/UL (ref 0–0.12)
BUN SERPL-MCNC: 21 MG/DL (ref 8–22)
CALCIUM SERPL-MCNC: 8.4 MG/DL (ref 8.5–10.5)
CHLORIDE SERPL-SCNC: 100 MMOL/L (ref 96–112)
CO2 SERPL-SCNC: 24 MMOL/L (ref 20–33)
CREAT SERPL-MCNC: 1.29 MG/DL (ref 0.5–1.4)
EOSINOPHIL # BLD AUTO: 0.14 K/UL (ref 0–0.51)
EOSINOPHIL NFR BLD: 2 % (ref 0–6.9)
ERYTHROCYTE [DISTWIDTH] IN BLOOD BY AUTOMATED COUNT: 47.3 FL (ref 35.9–50)
GLUCOSE BLD-MCNC: 303 MG/DL (ref 65–99)
GLUCOSE BLD-MCNC: 325 MG/DL (ref 65–99)
GLUCOSE BLD-MCNC: 404 MG/DL (ref 65–99)
GLUCOSE BLD-MCNC: 414 MG/DL (ref 65–99)
GLUCOSE BLD-MCNC: 441 MG/DL (ref 65–99)
GLUCOSE SERPL-MCNC: 321 MG/DL (ref 65–99)
HCT VFR BLD AUTO: 26.9 % (ref 37–47)
HGB BLD-MCNC: 8.7 G/DL (ref 12–16)
IMM GRANULOCYTES # BLD AUTO: 0.03 K/UL (ref 0–0.11)
IMM GRANULOCYTES NFR BLD AUTO: 0.4 % (ref 0–0.9)
LYMPHOCYTES # BLD AUTO: 0.77 K/UL (ref 1–4.8)
LYMPHOCYTES NFR BLD: 11.2 % (ref 22–41)
MAGNESIUM SERPL-MCNC: 1.4 MG/DL (ref 1.5–2.5)
MCH RBC QN AUTO: 27.7 PG (ref 27–33)
MCHC RBC AUTO-ENTMCNC: 32.3 G/DL (ref 33.6–35)
MCV RBC AUTO: 85.7 FL (ref 81.4–97.8)
MONOCYTES # BLD AUTO: 0.65 K/UL (ref 0–0.85)
MONOCYTES NFR BLD AUTO: 9.4 % (ref 0–13.4)
NEUTROPHILS # BLD AUTO: 5.26 K/UL (ref 2–7.15)
NEUTROPHILS NFR BLD: 76.4 % (ref 44–72)
NRBC # BLD AUTO: 0 K/UL
NRBC BLD-RTO: 0 /100 WBC
PLATELET # BLD AUTO: 179 K/UL (ref 164–446)
PMV BLD AUTO: 10 FL (ref 9–12.9)
POTASSIUM SERPL-SCNC: 3.7 MMOL/L (ref 3.6–5.5)
RBC # BLD AUTO: 3.14 M/UL (ref 4.2–5.4)
SODIUM SERPL-SCNC: 135 MMOL/L (ref 135–145)
TROPONIN T SERPL-MCNC: 27 NG/L (ref 6–19)
WBC # BLD AUTO: 6.9 K/UL (ref 4.8–10.8)

## 2021-08-26 PROCEDURE — 83735 ASSAY OF MAGNESIUM: CPT

## 2021-08-26 PROCEDURE — A9270 NON-COVERED ITEM OR SERVICE: HCPCS | Performed by: STUDENT IN AN ORGANIZED HEALTH CARE EDUCATION/TRAINING PROGRAM

## 2021-08-26 PROCEDURE — 700102 HCHG RX REV CODE 250 W/ 637 OVERRIDE(OP): Performed by: STUDENT IN AN ORGANIZED HEALTH CARE EDUCATION/TRAINING PROGRAM

## 2021-08-26 PROCEDURE — A9270 NON-COVERED ITEM OR SERVICE: HCPCS | Performed by: HOSPITALIST

## 2021-08-26 PROCEDURE — 99239 HOSP IP/OBS DSCHRG MGMT >30: CPT | Performed by: HOSPITALIST

## 2021-08-26 PROCEDURE — 36415 COLL VENOUS BLD VENIPUNCTURE: CPT

## 2021-08-26 PROCEDURE — 82962 GLUCOSE BLOOD TEST: CPT | Mod: 91

## 2021-08-26 PROCEDURE — 85025 COMPLETE CBC W/AUTO DIFF WBC: CPT

## 2021-08-26 PROCEDURE — 84484 ASSAY OF TROPONIN QUANT: CPT

## 2021-08-26 PROCEDURE — RXMED WILLOW AMBULATORY MEDICATION CHARGE: Performed by: HOSPITALIST

## 2021-08-26 PROCEDURE — 700111 HCHG RX REV CODE 636 W/ 250 OVERRIDE (IP): Performed by: HOSPITALIST

## 2021-08-26 PROCEDURE — 80048 BASIC METABOLIC PNL TOTAL CA: CPT

## 2021-08-26 PROCEDURE — 700102 HCHG RX REV CODE 250 W/ 637 OVERRIDE(OP): Performed by: HOSPITALIST

## 2021-08-26 RX ORDER — MAGNESIUM SULFATE HEPTAHYDRATE 40 MG/ML
2 INJECTION, SOLUTION INTRAVENOUS ONCE
Status: COMPLETED | OUTPATIENT
Start: 2021-08-26 | End: 2021-08-26

## 2021-08-26 RX ORDER — CEFDINIR 300 MG/1
300 CAPSULE ORAL 2 TIMES DAILY
Qty: 4 CAPSULE | Refills: 0 | Status: SHIPPED | OUTPATIENT
Start: 2021-08-26 | End: 2021-08-28

## 2021-08-26 RX ORDER — INSULIN LISPRO 100 [IU]/ML
INJECTION, SOLUTION INTRAVENOUS; SUBCUTANEOUS
Qty: 9 ML | Refills: 0 | Status: SHIPPED | OUTPATIENT
Start: 2021-08-26

## 2021-08-26 RX ORDER — LISINOPRIL 40 MG/1
40 TABLET ORAL 2 TIMES DAILY
Qty: 60 TABLET | Refills: 0 | Status: SHIPPED | OUTPATIENT
Start: 2021-08-26

## 2021-08-26 RX ADMIN — PRAVASTATIN SODIUM 20 MG: 20 TABLET ORAL at 05:24

## 2021-08-26 RX ADMIN — MAGNESIUM SULFATE 2 G: 2 INJECTION INTRAVENOUS at 14:35

## 2021-08-26 RX ADMIN — LISINOPRIL 40 MG: 20 TABLET ORAL at 05:24

## 2021-08-26 RX ADMIN — FUROSEMIDE 20 MG: 10 INJECTION, SOLUTION INTRAMUSCULAR; INTRAVENOUS at 05:24

## 2021-08-26 RX ADMIN — HEPARIN SODIUM 5000 UNITS: 5000 INJECTION, SOLUTION INTRAVENOUS; SUBCUTANEOUS at 05:24

## 2021-08-26 RX ADMIN — PAROXETINE 60 MG: 30 TABLET, FILM COATED ORAL at 05:24

## 2021-08-26 RX ADMIN — METOPROLOL TARTRATE 25 MG: 25 TABLET, FILM COATED ORAL at 05:24

## 2021-08-26 RX ADMIN — GABAPENTIN 100 MG: 100 CAPSULE ORAL at 05:24

## 2021-08-26 RX ADMIN — AMLODIPINE BESYLATE 5 MG: 5 TABLET ORAL at 05:23

## 2021-08-26 ASSESSMENT — CHA2DS2 SCORE
CHA2DS2 VASC SCORE: 5
PRIOR STROKE OR TIA OR THROMBOEMBOLISM: NO
VASCULAR DISEASE: NO
SEX: FEMALE
AGE 75 OR GREATER: YES
CHF OR LEFT VENTRICULAR DYSFUNCTION: NO
DIABETES: YES
HYPERTENSION: YES
AGE 65 TO 74: NO

## 2021-08-26 NOTE — DISCHARGE PLANNING
Meds-to-Beds: Discharge prescription orders listed below delivered to patient's bedside. RN Luis and Dr. Sanchez notified. Patient and her  counseled. Patient elected to have co-payment billed to patient account.     Reviewed signs and symptoms of bleeding and when to seek medical attention. Reviewed potential drug-drug interactions.     Current Outpatient Medications   Medication Sig Dispense Refill   • lisinopril (PRINIVIL) 40 MG tablet Take 1 Tablet by mouth 2 times a day. Hold medication for blood pressure less than 120/60 60 Tablet 0   • rivaroxaban (XARELTO) 15 MG Tab tablet Take 1 Tablet by mouth with dinner. 30 Tablet 0   • cefdinir (OMNICEF) 300 MG Cap Take 1 Capsule by mouth 2 times a day for 2 days. 4 Capsule 0   • metFORMIN (GLUCOPHAGE) 500 MG Tab Take 2 Tablets by mouth 2 times a day. 60 Tablet 0   • insulin glargine (LANTUS) 100 UNIT/ML Solution Pen-injector injection Inject 15 Units under the skin every evening. 3 mL 0   • Insulin Pen Needle 32 G x 4 mm Use one pen needle in pen device to inject insulin once daily. 100 Each 0   • insulin lispro (HUMALOG,ADMELOG) 100 UNIT/ML Solution Pen-injector injection PEN Inject 3 times a day per scale: 151-200: 1 u, 201-250: 2 u, 251-300: 4u, 301-350: 5 u, 351-400: 7 u, more than 400: 8 u. 9 mL 0        Isabella Monroy, PharmD

## 2021-08-26 NOTE — DISCHARGE SUMMARY
Discharge Summary    CHIEF COMPLAINT ON ADMISSION  Chief Complaint   Patient presents with   • Fall     fell off chair, per ems  witnessed and did not hit head   • Fatigue     exposed to son who tested + covid   • Altered Mental Status   • Fever       Reason for Admission  EMS     Admission Date  8/23/2021    CODE STATUS  Full Code    HPI & HOSPITAL COURSE  Please see original H&P for specific information  Josefina Carmona is a 76 y.o. female who presented 8/23/2021 with altered mental status.  Patient woke up this afternoon, she states that it is difficult for her to ambulate to the bathroom.  She felt her legs were very weak and he felt like it took an hour to get to the bottom.  She states that she did not remember everything that happened prior to admission.  Denies fall.  Denies dysuria.      Patient states that she has a history of hypertension and COPD she used to smoke heavily however states that she quit smoking several years ago.       In ED, Found to be febrile and tachycardic, other vital signs within normal limits..  Initially per ED she was altered and unable provide a clear history however after diuretics and fluids were administered, her mental status improved.  Remarkable labs include neutrophilic leukocytosis, sugar 311, lactic acid 2.3.  UA suggestive of UTI.    Patient was started on IV antibiotic, patient developed shortness of breath, chest x-ray showed mild pulmonary edema, echo showed normal ejection fraction, patient was started on IV Lasix, edema likely related to IV fluids/noncardiac, patient has history of diabetes she is on oral medications her blood sugar have been elevated in the 400s her A1c is 9.8, patient has been started on Lantus and Humalog, patient is going to be taking these 2 medications at home and she has received teaching and information regarding how to inject her insulin went to inject her insulin how to prevent hypoglycemia and how to distinguish it, patient  BMP this morning did not show acidosis no elevated anion gap, patient is back to her baseline oxygen requirement, patient is alert oriented follows commands she is eager to go back home, her blood sugar slowly improving down to 325, patient is recommended to check her blood sugar at home to prevent hypoglycemic episodes, I have discussed with patient and patient's  regarding diet, exercise routine, monitoring her blood sugar at home, patient and patient's  expressed understanding, she will continue on 2 more days of oral antibiotics for her UTI, oxygen at home has been arranged and confirmed, patient will follow up with her primary care physician, PT OT evaluated patient and no recommendations for PT OT as outpatient, at this time patient is hemodynamically stable, she is going to be discharged home today, again patient and patient  have expressed understanding of her discharge plan and agree with it her questions have been answered.    Therefore, she is discharged in good and stable condition to home with close outpatient follow-up.    The patient met 2-midnight criteria for an inpatient stay at the time of discharge.    Discharge Date  8/26/2021    FOLLOW UP ITEMS POST DISCHARGE  Primary care physician      DISCHARGE DIAGNOSES  Principal Problem (Resolved):    Sepsis (HCC) POA: Unknown  Active Problems:    COPD (chronic obstructive pulmonary disease) (HCC) POA: Unknown    Hypertension POA: Unknown    Hyperlipidemia POA: Unknown    Uncontrolled diabetes mellitus with hyperglycemia (HCC) POA: Unknown    Chronic anticoagulation POA: Unknown  Resolved Problems:    Acute UTI POA: Unknown    Lactic acidosis POA: Unknown    Acute on chronic respiratory failure with hypoxia (HCC) POA: Unknown      FOLLOW UP  No future appointments.  [facility] [address] [place] [address]  [phone]    Call  Please call [place] to establish with a Primary Care Provider. Thank  you.      MEDICATIONS ON DISCHARGE     Medication List      START taking these medications      Instructions   cefdinir 300 MG Caps  Commonly known as: OMNICEF   Take 1 Capsule by mouth 2 times a day for 2 days.  Dose: 300 mg     HumaLOG KwikPen 100 UNIT/ML Sopn injection PEN  Generic drug: insulin lispro   Inject 3 times a day per scale: 151-200: 1 u, 201-250: 2 u, 251-300: 4u, 301-350: 5 u, 351-400: 7 u, more than 400: 8 u.     Lantus SoloStar 100 UNIT/ML Sopn injection  Generic drug: insulin glargine   Inject 15 Units under the skin every evening.  Dose: 15 Units     Unifine Pentips 32G X 4 MM  Generic drug: Insulin Pen Needle 32 G x 4 mm   Doctor's comments: Per patient/formulary preference. ICD-10 code: E11.65 Uncontrolled type 2 Diabetes Mellitus  Use one pen needle in pen device to inject insulin once daily.        CHANGE how you take these medications      Instructions   lisinopril 40 MG tablet  What changed:   · medication strength  · how much to take  · when to take this  · additional instructions  Commonly known as: PRINIVIL   Take 1 Tablet by mouth 2 times a day. Hold medication for blood pressure less than 120/60  Dose: 40 mg     metFORMIN 500 MG Tabs  What changed: how much to take  Commonly known as: GLUCOPHAGE   Take 2 Tablets by mouth 2 times a day.  Dose: 1,000 mg     Xarelto 15 MG Tabs tablet  What changed:   · medication strength  · how much to take  Generic drug: rivaroxaban   Take 1 Tablet by mouth with dinner.  Dose: 15 mg        CONTINUE taking these medications      Instructions   albuterol 2.5mg/0.5ml Nebu  Commonly known as: PROVENTIL   Take 2.5 mg by nebulization every four hours as needed for Shortness of Breath.  Dose: 2.5 mg     ALPRAZolam 1 MG Tabs  Commonly known as: XANAX   Take 1 mg by mouth 3 times a day as needed for Sleep.  Dose: 1 mg     amLODIPine 5 MG Tabs  Commonly known as: NORVASC   Take 5 mg by mouth every day.  Dose: 5 mg     furosemide 20 MG Tabs  Commonly known as:  LASIX   Take 20 mg by mouth every day.  Dose: 20 mg     gabapentin 100 MG Caps  Commonly known as: NEURONTIN   Take 100 mg by mouth every day.  Dose: 100 mg     Klor-Con 8 MEQ tablet  Generic drug: potassium chloride   Take 8 mEq by mouth every day.  Dose: 8 mEq     metoprolol tartrate 25 MG Tabs  Commonly known as: LOPRESSOR   Take 25 mg by mouth 2 times a day.  Dose: 25 mg     paroxetine 40 MG tablet  Commonly known as: PAXIL   Take 60 mg by mouth every day.  Dose: 60 mg     pravastatin 20 MG Tabs  Commonly known as: PRAVACHOL   Take 20 mg by mouth every day.  Dose: 20 mg     traZODone 150 MG Tabs  Commonly known as: DESYREL   Take 150 mg by mouth every evening.  Dose: 150 mg     TRELEGY ELLIPTA INH   Inhale. Indications: COPD HX        STOP taking these medications    glipiZIDE 10 MG Tabs  Commonly known as: GLUCOTROL            Allergies  No Known Allergies    DIET  Orders Placed This Encounter   Procedures   • Diet Order Diet: Consistent CHO (Diabetic)     Standing Status:   Standing     Number of Occurrences:   1     Order Specific Question:   Diet:     Answer:   Consistent CHO (Diabetic) [4]       ACTIVITY  As tolerated.  Weight bearing as tolerated    CONSULTATIONS  None    PROCEDURES  None    LABORATORY  Lab Results   Component Value Date    SODIUM 135 08/26/2021    POTASSIUM 3.7 08/26/2021    CHLORIDE 100 08/26/2021    CO2 24 08/26/2021    GLUCOSE 321 (H) 08/26/2021    BUN 21 08/26/2021    CREATININE 1.29 08/26/2021        Lab Results   Component Value Date    WBC 6.9 08/26/2021    HEMOGLOBIN 8.7 (L) 08/26/2021    HEMATOCRIT 26.9 (L) 08/26/2021    PLATELETCT 179 08/26/2021        Total time of the discharge process exceeds 40 minutes.

## 2021-08-26 NOTE — PROGRESS NOTES
Assumed care of pt, received bedside report from CONNER Villagomez. Pt sitting up in bed, no complaints of pain, 4L O2 nasal cannula, A/Ox4. Fall and safety precautions in place, strip alarm in place. Discussed POC with pt, pt verbalizes understanding. No further needs at this time.

## 2021-08-26 NOTE — DISCHARGE PLANNING
Anticipated Discharge Disposition: Home    Action: Lsw completed assessment at bedside with spouse Sly and pt. Pt's address is [address] [place], [place] [address] [address] and contact number [phone]. Pt lives in a [facility] with spouse. Pt's PCP is Pineda Tse. Pt is independent with ADL/iADLs. Pt's spouse assists as needed and with transportation. Pt confirms being on O2 with [employer]. Sly states that there are tanks at home readily available for pt and 2 concetrators. Pt uses [employer] at home and has a shower chair. No notes hx of sa or mh. Sly provided this Lsw with a [employer] insurance card which this Lsw e-mailed to PFA to have input into chart.     Barriers to Discharge: none    Plan: home     Care Transition Team Assessment    Information Source  Orientation Level: Oriented X4  Information Given By: Patient, Spouse  Informant's Name: Phillip  Who is responsible for making decisions for patient? : Patient    Readmission Evaluation  Is this a readmission?: No    Elopement Risk  Legal Hold: No  Ambulatory or Self Mobile in Wheelchair: No-Not an Elopement Risk  Elopement Risk: Not at Risk for Elopement    Interdisciplinary Discharge Planning  Lives with - Patient's Self Care Capacity: Spouse  Patient or legal guardian wants to designate a caregiver: No  Housing / Facility: 1 Story Apartment / Condo  Prior Services: Intermittent Physical Support for ADL Per Family    Discharge Preparedness  What is your plan after discharge?: Home with help  What are your discharge supports?: Spouse  Prior Functional Level: Ambulatory, Drives Self, Independent with Activities of Daily Living, Needs Assist with Activities of Daily Living, Independent with Medication Management, Uses Walker  Difficulity with ADLs: None  Difficulity with IADLs: Driving  Difficulity with IADL Comments: spouse assists    Functional Assesment  Prior Functional Level: Ambulatory, Drives Self, Independent with Activities of Daily Living, Needs Assist with  Activities of Daily Living, Independent with Medication Management, Uses Walker    Finances  Financial Barriers to Discharge: No  Prescription Coverage: Yes    Vision / Hearing Impairment  Vision Impairment : No  Hearing Impairment : Yes  Hearing Impairment: Both Ears, Hearing Device Not Available  Does Pt Need Special Equipment for the Hearing Impaired?: No         Advance Directive  Advance Directive?: None    Domestic Abuse  Have you ever been the victim of abuse or violence?: No  Physical Abuse or Sexual Abuse: No  Verbal Abuse or Emotional Abuse: No  Possible Abuse/Neglect Reported to:: Not Applicable    Psychological Assessment  History of Substance Abuse: None  History of Psychiatric Problems: No  Non-compliant with Treatment: No    Discharge Risks or Barriers  Discharge risks or barriers?: No    Anticipated Discharge Information  Discharge Disposition: Discharged to home/self care (01)  Discharge Address: [address] [place] [address] [place] [address]  Discharge Contact Phone Number: [phone]

## 2021-08-26 NOTE — RESPIRATORY CARE
"  COPD EDUCATION by COPD CLINICAL EDUCATOR  8/25/2021  at  5:31 PM by Bridgette Vaughn, RRT     Patient interviewed by COPD education team.  Patient unable to participate in full program.  A short intervention has been conducted.  A comprehensive packet including information about COPD, types of treatments to manage their disease was provided and reviewed with patient at the bedside.  Action plan reviewed.  Quit smoking 82    New spacer with instruct done    COPD Assessment  COPD Clinical Specialists ONLY  COPD Education Initiated: Yes--Short Intervention   Physician Follow Up Appointment:  (goes to [place])  Pulmonary Follow Up Appointment:  (goes to [facility])  Pulmonologist Name: didn't know  Referrals Initiated: Yes  Pulmonary Rehab: Declined  Smoking Cessation: N/A (quit 81)  Hospice: N/A  Home Health Care: Declined  [facility] Outreach: Declined  [facility]: Declined  [facility]: Declined  Private In-Home Care Agency: N/A  Is this a COPD exacerbation patient?: No  $ Demo/Eval of SVN's, MDI's and Aerosols: Yes  (OP) Pulmonary Function Testing:  (on file at [facility])    Meds to Beds        MY COPD ACTION PLAN     It is recommended that patients and physicians /healthcare providers complete this action plan together. This plan should be discussed at each physician visit and updated as needed.    The green, yellow and red zones show groups of symptoms of COPD. This list of symptoms is not comprehensive, and you may experience other symptoms. In the \"Actions\" column, your healthcare provider has recommended actions for you to take based on your symptoms.    Patient Name: Josefina Carmona   YOB: 1945   Last Updated on:     Green Zone:  I am doing well today Actions   •  Usual activitiy and exercise level •  Take daily medications   •  Usual amounts of cough and phlegm/mucus •  Use oxygen as prescribed   •  Sleep well at night •  Continue regular exercise/diet plan   •  " "Appetite is good •  At all times avoid cigarette smoke, inhaled irritants     Daily Medications (these medications are taken every day):   Fluticasone and Umeclidinium and Vilanterol (Trelogy) 1 Puff Once daily        Yellow Zone:  I am having a bad day or a COPD flare Actions   •  More breathless than usual •  Continue daily medications   •  I have less energy for my daily activities •  Use quick relief inhaler as ordered   •  Increased or thicker phlegm/mucus •  Use oxygen as prescribed   •  Using quick relief inhaler/nebulizer more often •  Get plenty of rest   •  Swelling of ankles more than usual •  Use pursed lip breathing   •  More coughing than usual •  At all times avoid cigarette smoke, inhaled irritants   •  I feel like I have a \"chest cold\"   •  Poor sleep and my symptoms woke me up   •  My appetite is not good   •  My medicine is not helping    •  Call provider immediately if symptoms don’t improve     Continue daily medications, add rescue medications:   Albuterol  Albuterol 2 Puffs  2.5mg via nebulizer Every 4 hours PRN  Every 4 hours PRN       Medications to be used during a flare up, (as Discussed with Provider):           Additional Information:  Use your spacer with the inhaler. Use inhaler or nebulizer, not both at the same time.    Red Zone:  I need urgent medical care Actions   •  Severe shortness of breath even at rest •  Call 911 or seek medical care immediately   •  Not able to do any activity because of breathing    •  Fever or shaking chills    •  Feeling confused or very drowsy     •  Chest pains    •  Coughing up blood              "

## 2021-08-26 NOTE — PROGRESS NOTES
Pt given discharge instructions, pt verbalizes understanding. Pt discharged with all belongings and education. PIV and tele box removed. Pt discharged home with  in vehicle.

## 2021-08-26 NOTE — FACE TO FACE
"Face to Face Note  -  Durable Medical Equipment    Nolan Sanchez M.D. - NPI: [id number]  I certify that this patient is under my care and that they had a durable medical equipment(DME)face to face encounter by myself that meets the physician DME face-to-face encounter requirements with this patient on:    Date of encounter:   Patient:                    MRN:                       YOB: 2020  Josefina Carmona  [id number]  1945     The encounter with the patient was in whole, or in part, for the following medical condition, which is the primary reason for durable medical equipment:  COPD    I certify that, based on my findings, the following durable medical equipment is medically necessary:  Oxygen.    HOME O2 Saturation Measurements:(Values must be present for Home Oxygen orders)  Room air sat at rest: 83  Room air sat with amb: 80  With liters of O2: 4, O2 sat at rest with O2: 96  With Liters of O2: 5, O2 sat with amb with O2 : 90  Is the patient mobile?: Yes    My Clinical findings support the need for the above equipment due to:  Hypoxia    Supporting Symptoms: The patient requires supplemental oxygen, as the following interventions have been tried with limited or no improvement: \"Positive expiratory pressure therapies    If patient feels more short of breath, they can go up to 6 liters per minute and contact healthcare provider.  "

## 2021-08-26 NOTE — CARE PLAN
The patient is Watcher-medium risk of patient condition worsening or declining    Shift Goals  Clinical Goals: blood glucose control  Patient Goals: rest, comfort  Family Goals: address L ear discomfort      Problem: Knowledge Deficit - Standard  Goal: Patient and family/care givers will demonstrate understanding of plan of care, disease process/condition, diagnostic tests and medications  Outcome: Progressing     Problem: Skin Integrity  Goal: Skin integrity is maintained or improved  Outcome: Progressing     Problem: Fall Risk  Goal: Patient will remain free from falls  Outcome: Progressing     Problem: Respiratory  Goal: Patient will achieve/maintain optimum respiratory ventilation and gas exchange  Outcome: Progressing

## 2021-08-26 NOTE — DISCHARGE INSTRUCTIONS
Discharge Instructions per Nolan Sanchez M.D.    Monitor blood pressure at home, keep a log to show to primary care doctor  Hold lisinopril for blood pressure less than 120/60  Watch for low blood glucose symptoms.   Keep checking blood glucose at home at least 3-4 times a day keep a log to show to primary care doctor.    Insulin lantus 15 units daily  Insulin humalog take 3 times a day before meals as per scale  scale: 151-200: 1 units,   201-250: 2 units,   251-300: 4 units,   301-350: 5 units,   351-400: 7 units,   more than 400: 8 units.      DIET: healthy diet    ACTIVITY: as tolerated    DIAGNOSIS: chronic respiratory failure, UTI.    Return to ER if symptoms return, fever chills, shortness of breath, chest pain, palpitation.                       Discharge Instructions    Discharged to home by car with relative. Discharged via wheelchair, hospital escort: Yes.  Special equipment needed: Not Applicable    Be sure to schedule a follow-up appointment with your primary care doctor or any specialists as instructed.     Discharge Plan:   Diet Plan: Discussed  Activity Level: Discussed  Confirmed Follow up Appointment: Appointment Scheduled  Confirmed Symptoms Management: Discussed  Medication Reconciliation Updated: Yes    I understand that a diet low in cholesterol, fat, and sodium is recommended for good health. Unless I have been given specific instructions below for another diet, I accept this instruction as my diet prescription.   Other diet: Diabetic    Special Instructions:   Sepsis, Diagnosis, Adult  Sepsis is a serious bodily reaction to an infection. The infection that triggers sepsis may be from a bacteria, virus, or fungus. Sepsis can result from an infection in any part of your body. Infections that commonly lead to sepsis include skin, lung, and urinary tract infections.  Sepsis is a medical emergency that must be treated right away in a hospital. In severe cases, it can lead to septic  shock. Septic shock can weaken your heart and cause your blood pressure to drop. This can cause your central nervous system and your body's organs to stop working.  What are the causes?  This condition is caused by a severe reaction to infections from bacteria, viruses, or fungus. The germs that most often lead to sepsis include:  · Escherichia coli (E. coli) bacteria.  · Staphylococcus aureus (staph) bacteria.  · Some types of Streptococcus bacteria.  The most common infections affect these organs:  · The lung (pneumonia).  · The kidneys or bladder (urinary tract infection).  · The skin (cellulitis).  · The bowel, gallbladder, or pancreas.  What increases the risk?  You are more likely to develop this condition if:  · Your body's disease-fighting system (immune system) is weakened.  · You are age 65 or older.  · You are male.  · You had surgery or you have been hospitalized.  · You have these devices inserted into your body:  ? A small, thin tube (catheter).  ? IV line.  ? Breathing tube.  ? Drainage tube.  · You are not getting enough nutrients from food (malnourished).  · You have a long-term (chronic) disease, such as cancer, lung disease, kidney disease, or diabetes.  · You are .  What are the signs or symptoms?  Symptoms of this condition may include:  · Fever.  · Chills or feeling very cold.  · Confusion or anxiety.  · Fatigue.  · Muscle aches.  · Shortness of breath.  · Nausea and vomiting.  · Urinating much less than usual.  · Fast heart rate (tachycardia).  · Rapid breathing (hyperventilation).  · Changes in skin color. Your skin may look blotchy, pale, or blue.  · Cool, clammy, or sweaty skin.  · Skin rash.  Other symptoms depend on the source of your infection.  How is this diagnosed?  This condition is diagnosed based on:  · Your symptoms.  · Your medical history.  · A physical exam.  Other tests may also be done to find out the cause of the infection and how severe the sepsis is. These  tests may include:  · Blood tests.  · Urine tests.  · Swabs from other areas of your body that may have an infection. These samples may be tested (cultured) to find out what type of bacteria is causing the infection.  · Chest X-ray to check for pneumonia. Other imaging tests, such as a CT scan, may also be done.  · Lumbar puncture. This removes a small amount of the fluid that surrounds your brain and spinal cord. The fluid is then examined for infection.  How is this treated?  This condition must be treated in a hospital. Based on the cause of your infection, you may be given an antibiotic, antiviral, or antifungal medicine.  You may also receive:  · Fluids through an IV.  · Oxygen and breathing assistance.  · Medicines to increase your blood pressure.  · Kidney dialysis. This process cleans your blood if your kidneys have failed.  · Surgery to remove infected tissue.  · Blood transfusion if needed.  · Medicine to prevent blood clots.  · Nutrients to correct imbalances in basic body function (metabolism). You may:  ? Receive important salts and minerals (electrolytes) through an IV.  ? Have your blood sugar level adjusted.  Follow these instructions at home:  Medicines    · Take over-the-counter and prescription medicines only as told by your health care provider.  · If you were prescribed an antibiotic, antiviral, or antifungal medicine, take it as told by your health care provider. Do not stop taking the medicine even if you start to feel better.  General instructions  · If you have a catheter or other indwelling device, ask to have it removed as soon as possible.  · Keep all follow-up visits as told by your health care provider. This is important.  Contact a health care provider if:  · You do not feel like you are getting better or regaining strength.  · You are having trouble coping with your recovery.  · You frequently feel tired.  · You feel worse or do not seem to get better after surgery.  · You think you  may have an infection after surgery.  Get help right away if:  · You have any symptoms of sepsis.  · You have difficulty breathing.  · You have a rapid or skipping heartbeat.  · You become confused or disoriented.  · You have a high fever.  · Your skin becomes blotchy, pale, or blue.  · You have an infection that is getting worse or not getting better.  These symptoms may represent a serious problem that is an emergency. Do not wait to see if the symptoms will go away. Get medical help right away. Call your local emergency services (911 in the [place] Do not drive yourself to the hospital.  Summary  · Sepsis is a medical emergency that requires immediate treatment in a hospital.  · This condition is caused by a severe reaction to infections from bacteria, viruses, or fungus.  · Based on the cause of your infection, you may be given an antibiotic, antiviral, or antifungal medicine.  · Treatment may also include IV fluids, breathing assistance, and kidney dialysis.  This information is not intended to replace advice given to you by your health care provider. Make sure you discuss any questions you have with your health care provider.  Document Released: 09/15/2004 Document Revised: 07/26/2019 Document Reviewed: 07/26/2019  [employer] Patient Education © 2020 [employer]      · Is patient discharged on Warfarin / Coumadin?   No     Cefdinir capsules  What is this medicine?  CEFDINIR (SEF di ner) is a cephalosporin antibiotic. It is used to treat certain kinds of bacterial infections. It will not work for colds, flu, or other viral infections.  This medicine may be used for other purposes; ask your health care provider or pharmacist if you have questions.  COMMON BRAND NAME(S): Omnicef  What should I tell my health care provider before I take this medicine?  They need to know if you have any of these conditions:  · bleeding problems  · kidney disease  · stomach or intestine problems (especially colitis)  · an unusual or  allergic reaction to cefdinir, other cephalosporin antibiotics, penicillin, penicillamine, other foods, dyes or preservatives  · pregnant or trying to get pregnant  · breast-feeding  How should I use this medicine?  Take this medicine by mouth. Swallow it with a drink of water. Follow the directions on the prescription label. You can take it with or without food. If it upsets your stomach it may help to take it with food. Take your doses at regular intervals. Do not take it more often than directed. Finish all the medicine you are prescribed even if you think your infection is better.  Talk to your pediatrician regarding the use of this medicine in children. Special care may be needed.  Overdosage: If you think you have taken too much of this medicine contact a poison control center or emergency room at once.  NOTE: This medicine is only for you. Do not share this medicine with others.  What if I miss a dose?  If you miss a dose, take it as soon as you can. If it is almost time for your next dose, take only that dose. Do not take double or extra doses.  What may interact with this medicine?  · antacids that contain aluminum or magnesium  · iron supplements  · other antibiotics  · probenecid  This list may not describe all possible interactions. Give your health care provider a list of all the medicines, herbs, non-prescription drugs, or dietary supplements you use. Also tell them if you smoke, drink alcohol, or use illegal drugs. Some items may interact with your medicine.  What should I watch for while using this medicine?  Tell your doctor or health care provider if your symptoms do not get better in a few days.  This medicine may cause serious skin reactions. They can happen weeks to months after starting the medicine. Contact your health care provider right away if you notice fevers or flu-like symptoms with a rash. The rash may be red or purple and then turn into blisters or peeling of the skin. Or, you might  notice a red rash with swelling of the face, lips or lymph nodes in your neck or under your arms.  If you are diabetic you may get a false-positive result for sugar in your urine. Check with your doctor or health care provider before you change your diet or the dose of your diabetes medicine.  What side effects may I notice from receiving this medicine?  Side effects that you should report to your doctor or health care professional as soon as possible:  · allergic reactions like skin rash, itching or hives, swelling of the face, lips, or tongue  · bloody or watery diarrhea  · breathing problems  · fever  · redness, blistering, peeling or loosening of the skin, including inside the mouth  · seizures  · trouble passing urine or change in the amount of urine  · unusual bleeding or bruising  · unusually weak or tired  Side effects that usually do not require medical attention (report to your doctor or health care professional if they continue or are bothersome):  · constipation  · diarrhea  · dizziness  · dry mouth  · headache  · loss of appetite  · nausea, vomiting  · stomach pain  · stool discoloration  · tiredness  · vaginal discharge, itching, or odor in women  This list may not describe all possible side effects. Call your doctor for medical advice about side effects. You may report side effects to FDA at 8-963-FDA-6731.  Where should I keep my medicine?  Keep out of the reach of children.  Store at room temperature between 15 and 30 degrees C (59 and 86 degrees F). Throw the medicine away after the expiration date.  NOTE: This sheet is a summary. It may not cover all possible information. If you have questions about this medicine, talk to your doctor, pharmacist, or health care provider.  © 2020 [employer] Standard (2020-03-13 08:49:20)      Depression / Suicide Risk    [facility], it is important to learn how to keep safe from harming yourself.    Recognize the  warning signs:  · Abrupt changes in personality, positive or negative- including increase in energy   · Giving away possessions  · Change in eating patterns- significant weight changes-  positive or negative  · Change in sleeping patterns- unable to sleep or sleeping all the time   · Unwillingness or inability to communicate  · Depression  · Unusual sadness, discouragement and loneliness  · Talk of wanting to die  · Neglect of personal appearance   · Rebelliousness- reckless behavior  · Withdrawal from people/activities they love  · Confusion- inability to concentrate     If you or a loved one observes any of these behaviors or has concerns about self-harm, here's what you can do:  · Talk about it- your feelings and reasons for harming yourself  · Remove any means that you might use to hurt yourself (examples: pills, rope, extension cords, firearm)  · Get professional help from the community (Mental Health, Substance Abuse, psychological counseling)  · Do not be alone:Call your Safe Contact- someone whom you trust who will be there for you.  · Call your local CRISIS HOTLINE 420-5341 or 262-846-8068  · Call your local Children's Mobile Crisis Response Team [place] (838) 434-4920 or www.[employer]  · Call the toll free National Suicide Prevention Hotlines   · National Suicide Prevention Lifeline 533-020-XKDX (6049)  · National Hope Line Network 800-SUICIDE (853-1678)

## 2021-08-27 NOTE — CONSULTS
Diabetes education: pt has a hx of diabetes on oral agents prior to admit. Pt was admitted with blood sugar of 311 and Hg a1c of 9.8%. Pt was on regular insulin every hour x 3 ( confirmed with MD as he said blood sugars too high) with blood sugars of 441 (9 units), 414 ( 9 units) and 325 ( 9 units). Pt was to be on Semglee 10 unit ( 15 starting this evening) with sliding scale coverage using regular insulin.   Pt was discharged on lantus 15 units via solostar pen and Humalog, sliding scale via kwik pen.  Met with pt and SO. Pt had refused to give her own insulin . Pt stated her  SO test her blood sugars ( per SO 1 x month and has current meter and supplies), so he can give insulin.  Insulin management taught to SO to include insulin storage, shelf life, and site rotation. SO practiced with saline pens and injecting saline per syringe (drawn up by CDE). Discussed goal for blood sugars, why insulin needed, long term complications, and low blood sugars. Discussed controlled carbohydrates and proteins with every meal ( pt does not eat lunch so discussed not giving insulin if not eating a meal and why). Questions answered.

## 2021-08-29 LAB
BACTERIA BLD CULT: NORMAL
BACTERIA BLD CULT: NORMAL
SIGNIFICANT IND 70042: NORMAL
SIGNIFICANT IND 70042: NORMAL
SITE SITE: NORMAL
SITE SITE: NORMAL
SOURCE SOURCE: NORMAL
SOURCE SOURCE: NORMAL